# Patient Record
Sex: FEMALE | Race: WHITE | Employment: OTHER | URBAN - NONMETROPOLITAN AREA
[De-identification: names, ages, dates, MRNs, and addresses within clinical notes are randomized per-mention and may not be internally consistent; named-entity substitution may affect disease eponyms.]

---

## 2018-02-01 ENCOUNTER — DOCUMENTATION ONLY (OUTPATIENT)
Dept: FAMILY MEDICINE | Facility: OTHER | Age: 68
End: 2018-02-01

## 2018-02-01 RX ORDER — IBUPROFEN 800 MG/1
800 TABLET, FILM COATED ORAL EVERY 8 HOURS PRN
Status: ON HOLD | COMMUNITY
End: 2021-08-22

## 2018-02-01 RX ORDER — METOPROLOL SUCCINATE 100 MG/1
100 TABLET, EXTENDED RELEASE ORAL DAILY
COMMUNITY

## 2018-02-01 RX ORDER — DULOXETIN HYDROCHLORIDE 20 MG/1
60 CAPSULE, DELAYED RELEASE ORAL DAILY
Status: ON HOLD | COMMUNITY
End: 2021-08-19

## 2018-02-01 RX ORDER — NICOTINE 21 MG/24HR
1 PATCH, TRANSDERMAL 24 HOURS TRANSDERMAL
Status: ON HOLD | COMMUNITY
End: 2021-08-19

## 2018-02-01 RX ORDER — HYDROXYZINE HYDROCHLORIDE 25 MG/1
25 TABLET, FILM COATED ORAL
COMMUNITY

## 2018-06-06 DIAGNOSIS — R06.09 DOE (DYSPNEA ON EXERTION): Primary | ICD-10-CM

## 2018-06-13 ENCOUNTER — TELEPHONE (OUTPATIENT)
Dept: CARDIOLOGY | Facility: OTHER | Age: 68
End: 2018-06-13

## 2018-06-13 NOTE — TELEPHONE ENCOUNTER
Patient verified .  Reminder call for stress test with instructions given.and to hold beta blocker  Patient verbalized understanding.

## 2018-06-19 ENCOUNTER — HOSPITAL ENCOUNTER (OUTPATIENT)
Dept: CARDIOLOGY | Facility: OTHER | Age: 68
End: 2018-06-19
Payer: MEDICARE

## 2018-06-19 ENCOUNTER — HOSPITAL ENCOUNTER (OUTPATIENT)
Dept: CARDIOLOGY | Facility: OTHER | Age: 68
Discharge: HOME | End: 2018-06-19
Admitting: INTERNAL MEDICINE
Payer: MEDICARE

## 2018-06-19 VITALS — HEIGHT: 64 IN | WEIGHT: 223 LBS | BODY MASS INDEX: 38.07 KG/M2

## 2018-06-19 DIAGNOSIS — R06.09 DOE (DYSPNEA ON EXERTION): ICD-10-CM

## 2018-06-19 PROCEDURE — 93325 DOPPLER ECHO COLOR FLOW MAPG: CPT | Mod: 26 | Performed by: INTERNAL MEDICINE

## 2018-06-19 PROCEDURE — 93350 STRESS TTE ONLY: CPT | Mod: 26 | Performed by: INTERNAL MEDICINE

## 2018-06-19 PROCEDURE — 40000264 ECHO DOBUTAMINE STRESS TEST WITH DEFINITY

## 2018-06-19 PROCEDURE — 93016 CV STRESS TEST SUPVJ ONLY: CPT | Performed by: INTERNAL MEDICINE

## 2018-06-19 PROCEDURE — 93321 DOPPLER ECHO F-UP/LMTD STD: CPT | Mod: 26 | Performed by: INTERNAL MEDICINE

## 2018-06-19 PROCEDURE — 25000128 H RX IP 250 OP 636: Performed by: INTERNAL MEDICINE

## 2018-06-19 PROCEDURE — 25500064 ZZH RX 255 OP 636

## 2018-06-19 PROCEDURE — 93017 CV STRESS TEST TRACING ONLY: CPT

## 2018-06-19 PROCEDURE — 93018 CV STRESS TEST I&R ONLY: CPT | Performed by: INTERNAL MEDICINE

## 2018-06-19 RX ORDER — SODIUM CHLORIDE 9 MG/ML
INJECTION, SOLUTION INTRAVENOUS CONTINUOUS
Status: DISCONTINUED | OUTPATIENT
Start: 2018-06-19 | End: 2018-06-20 | Stop reason: HOSPADM

## 2018-06-19 RX ORDER — ATROPINE SULFATE 0.1 MG/ML
0.2 INJECTION INTRAVENOUS
Status: DISCONTINUED | OUTPATIENT
Start: 2018-06-19 | End: 2018-06-20 | Stop reason: HOSPADM

## 2018-06-19 RX ORDER — DOBUTAMINE HYDROCHLORIDE 200 MG/100ML
10-40 INJECTION INTRAVENOUS CONTINUOUS
Status: DISCONTINUED | OUTPATIENT
Start: 2018-06-19 | End: 2018-06-20 | Stop reason: HOSPADM

## 2018-06-19 RX ORDER — ALBUTEROL SULFATE 90 UG/1
2 AEROSOL, METERED RESPIRATORY (INHALATION) EVERY 6 HOURS PRN
COMMUNITY
Start: 2018-04-06

## 2018-06-19 RX ORDER — TRAZODONE HYDROCHLORIDE 100 MG/1
150 TABLET ORAL AT BEDTIME
COMMUNITY

## 2018-06-19 RX ORDER — IPRATROPIUM BROMIDE AND ALBUTEROL SULFATE 2.5; .5 MG/3ML; MG/3ML
3 SOLUTION RESPIRATORY (INHALATION)
Status: ON HOLD | COMMUNITY
Start: 2018-04-06 | End: 2021-08-19

## 2018-06-19 RX ADMIN — PERFLUTREN 9 ML: 6.52 INJECTION, SUSPENSION INTRAVENOUS at 08:45

## 2018-06-19 RX ADMIN — DOBUTAMINE IN DEXTROSE 10 MCG/KG/MIN: 200 INJECTION, SOLUTION INTRAVENOUS at 08:30

## 2018-06-19 NOTE — PROGRESS NOTES
0730 The patient arrived for a Dobutamine stress echo.  The procedure, risks and benefits were discussed and the consent was signed.  The patient was prepped for the stress test, an IV was started,  and the echo sonographer did the initial images with Definity for image enhancement.   Dr. Randhawa arrived, and the Dobutamine protocol was started via multistep infusion.  The patient tolerated the procedure well.  Stress images were completed, normal saline was infused post Dobutamine,  the IV was removed. The patient was informed that the test results would be given to them by Esther Godinez NP.  The patient was released in stable condition.  Please see the chart for complete test results.

## 2018-06-26 ENCOUNTER — HOSPITAL ENCOUNTER (OUTPATIENT)
Dept: RESPIRATORY THERAPY | Facility: OTHER | Age: 68
Discharge: HOME | End: 2018-06-26
Attending: PHYSICIAN ASSISTANT | Admitting: PHYSICIAN ASSISTANT
Payer: MEDICARE

## 2018-06-26 LAB
DLCOCOR-%PRED-PRE: 117 %
DLCOCOR-PRE: 24.21 ML/MIN/MMHG
DLCOUNC-%PRED-PRE: 122 %
DLCOUNC-PRE: 25.26 ML/MIN/MMHG
DLCOUNC-PRED: 20.65 ML/MIN/MMHG
ERV-%PRED-PRE: 42 %
ERV-PRE: 0.09 L
ERV-PRED: 0.22 L
EXPTIME-PRE: 7.29 SEC
FEF2575-%PRED-POST: 61 %
FEF2575-%PRED-PRE: 37 %
FEF2575-POST: 1.17 L/SEC
FEF2575-PRE: 0.72 L/SEC
FEF2575-PRED: 1.92 L/SEC
FEFMAX-%PRED-PRE: 61 %
FEFMAX-PRE: 3.5 L/SEC
FEFMAX-PRED: 5.73 L/SEC
FEV1-%PRED-PRE: 56 %
FEV1-PRE: 1.27 L
FEV1FEV6-PRE: 65 %
FEV1FEV6-PRED: 79 %
FEV1FVC-PRE: 65 %
FEV1FVC-PRED: 76 %
FEV1SVC-PRE: 47 %
FEV1SVC-PRED: 72 %
FIFMAX-PRE: 2.12 L/SEC
FRCPLETH-%PRED-PRE: 159 %
FRCPLETH-PRE: 4.32 L
FRCPLETH-PRED: 2.71 L
FVC-%PRED-PRE: 67 %
FVC-PRE: 1.95 L
FVC-PRED: 2.89 L
GAW-%PRED-PRE: 13 %
GAW-PRE: 0.13 L/S/CMH2O
GAW-PRED: 1.03 L/S/CMH2O
IC-%PRED-PRE: 91 %
IC-PRE: 2.63 L
IC-PRED: 2.87 L
RVPLETH-%PRED-PRE: 206 %
RVPLETH-PRE: 4.22 L
RVPLETH-PRED: 2.04 L
SGAW-%PRED-PRE: 30 %
SGAW-PRE: 0.03 1/CMH2O*S
SGAW-PRED: 0.1 1/CMH2O*S
SRAW-%PRED-PRE: 701 %
SRAW-PRE: 33.41 CMH2O*S
SRAW-PRED: 4.76 CMH2O*S
TLCPLETH-%PRED-PRE: 140 %
TLCPLETH-PRE: 6.94 L
TLCPLETH-PRED: 4.94 L
VA-%PRED-PRE: 99 %
VA-PRE: 5.02 L
VC-%PRED-PRE: 88 %
VC-PRE: 2.72 L
VC-PRED: 3.09 L

## 2018-06-26 PROCEDURE — 94010 BREATHING CAPACITY TEST: CPT | Mod: 26 | Performed by: INTERNAL MEDICINE

## 2018-06-26 PROCEDURE — 40000275 ZZH STATISTIC RCP TIME EA 10 MIN

## 2018-06-26 PROCEDURE — 94726 PLETHYSMOGRAPHY LUNG VOLUMES: CPT

## 2018-06-26 PROCEDURE — 94060 EVALUATION OF WHEEZING: CPT

## 2018-06-26 PROCEDURE — 94729 DIFFUSING CAPACITY: CPT

## 2018-06-26 PROCEDURE — 94640 AIRWAY INHALATION TREATMENT: CPT

## 2018-06-26 PROCEDURE — 40000809 ZZH STATISTIC NO DOCUMENTATION TO SUPPORT CHARGE

## 2018-06-26 PROCEDURE — 94726 PLETHYSMOGRAPHY LUNG VOLUMES: CPT | Mod: 26 | Performed by: INTERNAL MEDICINE

## 2018-06-26 PROCEDURE — 94729 DIFFUSING CAPACITY: CPT | Mod: 26 | Performed by: INTERNAL MEDICINE

## 2018-06-26 PROCEDURE — 25000125 ZZHC RX 250: Performed by: PHYSICIAN ASSISTANT

## 2018-06-26 PROCEDURE — 94010 BREATHING CAPACITY TEST: CPT

## 2018-06-26 RX ORDER — ALBUTEROL SULFATE 0.83 MG/ML
2.5 SOLUTION RESPIRATORY (INHALATION) ONCE
Status: COMPLETED | OUTPATIENT
Start: 2018-06-26 | End: 2018-06-26

## 2018-06-26 RX ADMIN — ALBUTEROL SULFATE 2.5 MG: 2.5 SOLUTION RESPIRATORY (INHALATION) at 14:26

## 2021-08-19 ENCOUNTER — HOSPITAL ENCOUNTER (INPATIENT)
Facility: OTHER | Age: 71
LOS: 3 days | Discharge: HOME | DRG: 177 | End: 2021-08-22
Attending: EMERGENCY MEDICINE | Admitting: INTERNAL MEDICINE
Payer: COMMERCIAL

## 2021-08-19 ENCOUNTER — APPOINTMENT (OUTPATIENT)
Dept: GENERAL RADIOLOGY | Facility: OTHER | Age: 71
DRG: 177 | End: 2021-08-19
Attending: EMERGENCY MEDICINE
Payer: COMMERCIAL

## 2021-08-19 DIAGNOSIS — J12.82 PNEUMONIA DUE TO COVID-19 VIRUS: Primary | ICD-10-CM

## 2021-08-19 DIAGNOSIS — F41.9 ANXIETY: ICD-10-CM

## 2021-08-19 DIAGNOSIS — F17.210 CIGARETTE SMOKER: ICD-10-CM

## 2021-08-19 DIAGNOSIS — Z99.81 DEPENDENCE ON SUPPLEMENTAL OXYGEN: ICD-10-CM

## 2021-08-19 DIAGNOSIS — U07.1 2019 NOVEL CORONAVIRUS DISEASE (COVID-19): ICD-10-CM

## 2021-08-19 DIAGNOSIS — J44.89 COPD WITH ASTHMA (H): ICD-10-CM

## 2021-08-19 DIAGNOSIS — U07.1 PNEUMONIA DUE TO COVID-19 VIRUS: Primary | ICD-10-CM

## 2021-08-19 DIAGNOSIS — G47.00 INSOMNIA, UNSPECIFIED TYPE: ICD-10-CM

## 2021-08-19 DIAGNOSIS — Z79.51 LONG TERM CURRENT USE OF INHALED STEROID: ICD-10-CM

## 2021-08-19 PROBLEM — J96.01 ACUTE RESPIRATORY FAILURE WITH HYPOXIA (H): Status: ACTIVE | Noted: 2021-08-19

## 2021-08-19 PROBLEM — J45.909 ASTHMA: Status: ACTIVE | Noted: 2021-08-19

## 2021-08-19 LAB
ABO/RH(D): NORMAL
ALBUMIN SERPL-MCNC: 3.6 G/DL (ref 3.5–5.7)
ALP SERPL-CCNC: 28 U/L (ref 34–104)
ALT SERPL W P-5'-P-CCNC: 20 U/L (ref 7–52)
ANION GAP SERPL CALCULATED.3IONS-SCNC: 8 MMOL/L (ref 3–14)
AST SERPL W P-5'-P-CCNC: 34 U/L (ref 13–39)
BASE EXCESS BLDV CALC-SCNC: 8 MMOL/L (ref -7.7–1.9)
BASOPHILS # BLD AUTO: 0 10E3/UL (ref 0–0.2)
BASOPHILS NFR BLD AUTO: 0 %
BILIRUB SERPL-MCNC: 0.6 MG/DL (ref 0.3–1)
BUN SERPL-MCNC: 13 MG/DL (ref 7–25)
CALCIUM SERPL-MCNC: 8.8 MG/DL (ref 8.6–10.3)
CHLORIDE BLD-SCNC: 95 MMOL/L (ref 98–107)
CO2 SERPL-SCNC: 32 MMOL/L (ref 21–31)
CREAT SERPL-MCNC: 0.88 MG/DL (ref 0.6–1.2)
CRP SERPL-MCNC: 107.8 MG/L
D DIMER PPP FEU-MCNC: 1.13 UG/ML FEU (ref 0–0.5)
EOSINOPHIL # BLD AUTO: 0 10E3/UL (ref 0–0.7)
EOSINOPHIL NFR BLD AUTO: 0 %
ERYTHROCYTE [DISTWIDTH] IN BLOOD BY AUTOMATED COUNT: 13.8 % (ref 10–15)
GFR SERPL CREATININE-BSD FRML MDRD: 66 ML/MIN/1.73M2
GLUCOSE BLD-MCNC: 157 MG/DL (ref 70–105)
HCO3 BLDV-SCNC: 34 MMOL/L (ref 21–28)
HCT VFR BLD AUTO: 43.5 % (ref 35–47)
HGB BLD-MCNC: 14.4 G/DL (ref 11.7–15.7)
HOLD SPECIMEN: NORMAL
HOLD SPECIMEN: NORMAL
IMM GRANULOCYTES # BLD: 0 10E3/UL
IMM GRANULOCYTES NFR BLD: 0 %
LACTATE SERPL-SCNC: 1.9 MMOL/L (ref 0.7–2)
LYMPHOCYTES # BLD AUTO: 0.5 10E3/UL (ref 0.8–5.3)
LYMPHOCYTES NFR BLD AUTO: 7 %
MAGNESIUM SERPL-MCNC: 2 MG/DL (ref 1.9–2.7)
MCH RBC QN AUTO: 29.8 PG (ref 26.5–33)
MCHC RBC AUTO-ENTMCNC: 33.1 G/DL (ref 31.5–36.5)
MCV RBC AUTO: 90 FL (ref 78–100)
MONOCYTES # BLD AUTO: 0.3 10E3/UL (ref 0–1.3)
MONOCYTES NFR BLD AUTO: 4 %
NEUTROPHILS # BLD AUTO: 6.4 10E3/UL (ref 1.6–8.3)
NEUTROPHILS NFR BLD AUTO: 89 %
NRBC # BLD AUTO: 0 10E3/UL
NRBC BLD AUTO-RTO: 0 /100
O2/TOTAL GAS SETTING VFR VENT: 64 %
OXYHGB MFR BLDV: 70 % (ref 70–75)
PCO2 BLDV: 48 MM HG (ref 40–50)
PH BLDV: 7.45 [PH] (ref 7.32–7.43)
PLATELET # BLD AUTO: 156 10E3/UL (ref 150–450)
PO2 BLDV: 35 MM HG (ref 25–47)
POTASSIUM BLD-SCNC: 4.2 MMOL/L (ref 3.5–5.1)
PROT SERPL-MCNC: 7.1 G/DL (ref 6.4–8.9)
RBC # BLD AUTO: 4.83 10E6/UL (ref 3.8–5.2)
SODIUM SERPL-SCNC: 135 MMOL/L (ref 134–144)
SPECIMEN EXPIRATION DATE: NORMAL
TROPONIN I SERPL-MCNC: 13.7 PG/ML (ref 0–34)
WBC # BLD AUTO: 7.2 10E3/UL (ref 4–11)

## 2021-08-19 PROCEDURE — 200N000001 HC R&B ICU

## 2021-08-19 PROCEDURE — 99223 1ST HOSP IP/OBS HIGH 75: CPT | Mod: AI | Performed by: INTERNAL MEDICINE

## 2021-08-19 PROCEDURE — 258N000003 HC RX IP 258 OP 636: Performed by: EMERGENCY MEDICINE

## 2021-08-19 PROCEDURE — 36415 COLL VENOUS BLD VENIPUNCTURE: CPT | Performed by: EMERGENCY MEDICINE

## 2021-08-19 PROCEDURE — 94640 AIRWAY INHALATION TREATMENT: CPT

## 2021-08-19 PROCEDURE — 83735 ASSAY OF MAGNESIUM: CPT | Performed by: EMERGENCY MEDICINE

## 2021-08-19 PROCEDURE — 85379 FIBRIN DEGRADATION QUANT: CPT | Performed by: INTERNAL MEDICINE

## 2021-08-19 PROCEDURE — 250N000013 HC RX MED GY IP 250 OP 250 PS 637: Mod: GY | Performed by: EMERGENCY MEDICINE

## 2021-08-19 PROCEDURE — 87040 BLOOD CULTURE FOR BACTERIA: CPT | Performed by: EMERGENCY MEDICINE

## 2021-08-19 PROCEDURE — 258N000003 HC RX IP 258 OP 636: Performed by: INTERNAL MEDICINE

## 2021-08-19 PROCEDURE — 999N000105 HC STATISTIC NO DOCUMENTATION TO SUPPORT CHARGE

## 2021-08-19 PROCEDURE — 250N000012 HC RX MED GY IP 250 OP 636 PS 637: Performed by: INTERNAL MEDICINE

## 2021-08-19 PROCEDURE — 99285 EMERGENCY DEPT VISIT HI MDM: CPT | Mod: 25 | Performed by: EMERGENCY MEDICINE

## 2021-08-19 PROCEDURE — 82805 BLOOD GASES W/O2 SATURATION: CPT | Performed by: EMERGENCY MEDICINE

## 2021-08-19 PROCEDURE — 86140 C-REACTIVE PROTEIN: CPT | Performed by: INTERNAL MEDICINE

## 2021-08-19 PROCEDURE — 94664 DEMO&/EVAL PT USE INHALER: CPT

## 2021-08-19 PROCEDURE — 250N000013 HC RX MED GY IP 250 OP 250 PS 637: Mod: GY | Performed by: INTERNAL MEDICINE

## 2021-08-19 PROCEDURE — 94640 AIRWAY INHALATION TREATMENT: CPT | Mod: 76

## 2021-08-19 PROCEDURE — 83605 ASSAY OF LACTIC ACID: CPT | Performed by: EMERGENCY MEDICINE

## 2021-08-19 PROCEDURE — 71045 X-RAY EXAM CHEST 1 VIEW: CPT

## 2021-08-19 PROCEDURE — 99285 EMERGENCY DEPT VISIT HI MDM: CPT | Performed by: EMERGENCY MEDICINE

## 2021-08-19 PROCEDURE — 85025 COMPLETE CBC W/AUTO DIFF WBC: CPT | Performed by: EMERGENCY MEDICINE

## 2021-08-19 PROCEDURE — 96374 THER/PROPH/DIAG INJ IV PUSH: CPT | Performed by: EMERGENCY MEDICINE

## 2021-08-19 PROCEDURE — 250N000009 HC RX 250: Performed by: INTERNAL MEDICINE

## 2021-08-19 PROCEDURE — 86901 BLOOD TYPING SEROLOGIC RH(D): CPT | Performed by: INTERNAL MEDICINE

## 2021-08-19 PROCEDURE — 80053 COMPREHEN METABOLIC PANEL: CPT | Performed by: EMERGENCY MEDICINE

## 2021-08-19 PROCEDURE — 84484 ASSAY OF TROPONIN QUANT: CPT | Performed by: EMERGENCY MEDICINE

## 2021-08-19 PROCEDURE — 250N000011 HC RX IP 250 OP 636: Performed by: INTERNAL MEDICINE

## 2021-08-19 PROCEDURE — 250N000011 HC RX IP 250 OP 636: Performed by: EMERGENCY MEDICINE

## 2021-08-19 PROCEDURE — 999N000157 HC STATISTIC RCP TIME EA 10 MIN

## 2021-08-19 PROCEDURE — XW033E5 INTRODUCTION OF REMDESIVIR ANTI-INFECTIVE INTO PERIPHERAL VEIN, PERCUTANEOUS APPROACH, NEW TECHNOLOGY GROUP 5: ICD-10-PCS | Performed by: INTERNAL MEDICINE

## 2021-08-19 RX ORDER — BUPROPION HYDROCHLORIDE 150 MG/1
150 TABLET ORAL EVERY MORNING
COMMUNITY

## 2021-08-19 RX ORDER — LIDOCAINE 50 MG/G
1 PATCH TOPICAL DAILY PRN
COMMUNITY

## 2021-08-19 RX ORDER — PREDNISONE 10 MG/1
20 TABLET ORAL 2 TIMES DAILY
Status: ON HOLD | COMMUNITY
End: 2021-08-22

## 2021-08-19 RX ORDER — ACETAMINOPHEN 650 MG/1
650 SUPPOSITORY RECTAL ONCE
Status: COMPLETED | OUTPATIENT
Start: 2021-08-19 | End: 2021-08-19

## 2021-08-19 RX ORDER — NALOXONE HYDROCHLORIDE 0.4 MG/ML
0.4 INJECTION, SOLUTION INTRAMUSCULAR; INTRAVENOUS; SUBCUTANEOUS
Status: DISCONTINUED | OUTPATIENT
Start: 2021-08-19 | End: 2021-08-22 | Stop reason: HOSPADM

## 2021-08-19 RX ORDER — PROCHLORPERAZINE 25 MG
12.5 SUPPOSITORY, RECTAL RECTAL EVERY 12 HOURS PRN
Status: DISCONTINUED | OUTPATIENT
Start: 2021-08-19 | End: 2021-08-22 | Stop reason: HOSPADM

## 2021-08-19 RX ORDER — ALBUTEROL SULFATE 90 UG/1
2 AEROSOL, METERED RESPIRATORY (INHALATION) 4 TIMES DAILY
Status: DISCONTINUED | OUTPATIENT
Start: 2021-08-19 | End: 2021-08-22 | Stop reason: HOSPADM

## 2021-08-19 RX ORDER — ALBUTEROL SULFATE 0.83 MG/ML
2.5 SOLUTION RESPIRATORY (INHALATION) EVERY 4 HOURS PRN
COMMUNITY

## 2021-08-19 RX ORDER — AMOXICILLIN 250 MG
1 CAPSULE ORAL 2 TIMES DAILY PRN
Status: DISCONTINUED | OUTPATIENT
Start: 2021-08-19 | End: 2021-08-22 | Stop reason: HOSPADM

## 2021-08-19 RX ORDER — ONDANSETRON 2 MG/ML
4 INJECTION INTRAMUSCULAR; INTRAVENOUS EVERY 6 HOURS PRN
Status: DISCONTINUED | OUTPATIENT
Start: 2021-08-19 | End: 2021-08-22 | Stop reason: HOSPADM

## 2021-08-19 RX ORDER — PROCHLORPERAZINE MALEATE 5 MG
5 TABLET ORAL EVERY 6 HOURS PRN
Status: DISCONTINUED | OUTPATIENT
Start: 2021-08-19 | End: 2021-08-22 | Stop reason: HOSPADM

## 2021-08-19 RX ORDER — BUPROPION HYDROCHLORIDE 150 MG/1
150 TABLET ORAL DAILY
Status: DISCONTINUED | OUTPATIENT
Start: 2021-08-19 | End: 2021-08-22 | Stop reason: HOSPADM

## 2021-08-19 RX ORDER — TIOTROPIUM BROMIDE 18 UG/1
18 CAPSULE ORAL; RESPIRATORY (INHALATION) DAILY
COMMUNITY

## 2021-08-19 RX ORDER — TRAZODONE HYDROCHLORIDE 50 MG/1
150 TABLET, FILM COATED ORAL AT BEDTIME
Status: DISCONTINUED | OUTPATIENT
Start: 2021-08-19 | End: 2021-08-22 | Stop reason: HOSPADM

## 2021-08-19 RX ORDER — METHYLPREDNISOLONE SODIUM SUCCINATE 125 MG/2ML
125 INJECTION, POWDER, LYOPHILIZED, FOR SOLUTION INTRAMUSCULAR; INTRAVENOUS ONCE
Status: COMPLETED | OUTPATIENT
Start: 2021-08-19 | End: 2021-08-19

## 2021-08-19 RX ORDER — AMOXICILLIN 250 MG
2 CAPSULE ORAL 2 TIMES DAILY PRN
Status: DISCONTINUED | OUTPATIENT
Start: 2021-08-19 | End: 2021-08-22 | Stop reason: HOSPADM

## 2021-08-19 RX ORDER — LIDOCAINE 40 MG/G
CREAM TOPICAL
Status: DISCONTINUED | OUTPATIENT
Start: 2021-08-19 | End: 2021-08-22 | Stop reason: HOSPADM

## 2021-08-19 RX ORDER — METOPROLOL SUCCINATE 100 MG/1
100 TABLET, EXTENDED RELEASE ORAL DAILY
Status: DISCONTINUED | OUTPATIENT
Start: 2021-08-19 | End: 2021-08-22 | Stop reason: HOSPADM

## 2021-08-19 RX ORDER — LIDOCAINE 50 MG/G
1 PATCH TOPICAL DAILY PRN
Status: DISCONTINUED | OUTPATIENT
Start: 2021-08-19 | End: 2021-08-22 | Stop reason: HOSPADM

## 2021-08-19 RX ORDER — METOPROLOL SUCCINATE 50 MG/1
50 TABLET, EXTENDED RELEASE ORAL DAILY
Status: DISCONTINUED | OUTPATIENT
Start: 2021-08-19 | End: 2021-08-19 | Stop reason: DRUGHIGH

## 2021-08-19 RX ORDER — ACETAMINOPHEN 325 MG/1
650 TABLET ORAL ONCE
Status: COMPLETED | OUTPATIENT
Start: 2021-08-19 | End: 2021-08-19

## 2021-08-19 RX ORDER — NALOXONE HYDROCHLORIDE 0.4 MG/ML
0.2 INJECTION, SOLUTION INTRAMUSCULAR; INTRAVENOUS; SUBCUTANEOUS
Status: DISCONTINUED | OUTPATIENT
Start: 2021-08-19 | End: 2021-08-22 | Stop reason: HOSPADM

## 2021-08-19 RX ORDER — OLOPATADINE HYDROCHLORIDE 1 MG/ML
SOLUTION/ DROPS OPHTHALMIC
COMMUNITY

## 2021-08-19 RX ORDER — ALBUTEROL SULFATE 90 UG/1
6 AEROSOL, METERED RESPIRATORY (INHALATION) ONCE
Status: COMPLETED | OUTPATIENT
Start: 2021-08-19 | End: 2021-08-19

## 2021-08-19 RX ORDER — FAMOTIDINE 20 MG/1
20 TABLET, FILM COATED ORAL 2 TIMES DAILY
Status: DISCONTINUED | OUTPATIENT
Start: 2021-08-19 | End: 2021-08-22 | Stop reason: HOSPADM

## 2021-08-19 RX ORDER — SODIUM CHLORIDE, SODIUM LACTATE, POTASSIUM CHLORIDE, CALCIUM CHLORIDE 600; 310; 30; 20 MG/100ML; MG/100ML; MG/100ML; MG/100ML
INJECTION, SOLUTION INTRAVENOUS CONTINUOUS
Status: DISCONTINUED | OUTPATIENT
Start: 2021-08-19 | End: 2021-08-22

## 2021-08-19 RX ORDER — HYDROMORPHONE HYDROCHLORIDE 1 MG/ML
0.2 INJECTION, SOLUTION INTRAMUSCULAR; INTRAVENOUS; SUBCUTANEOUS
Status: DISCONTINUED | OUTPATIENT
Start: 2021-08-19 | End: 2021-08-22 | Stop reason: HOSPADM

## 2021-08-19 RX ORDER — ACETAMINOPHEN 325 MG/1
975 TABLET ORAL EVERY 8 HOURS
Status: DISCONTINUED | OUTPATIENT
Start: 2021-08-19 | End: 2021-08-22 | Stop reason: HOSPADM

## 2021-08-19 RX ORDER — ONDANSETRON 4 MG/1
4 TABLET, ORALLY DISINTEGRATING ORAL EVERY 6 HOURS PRN
Status: DISCONTINUED | OUTPATIENT
Start: 2021-08-19 | End: 2021-08-22 | Stop reason: HOSPADM

## 2021-08-19 RX ADMIN — ACETAMINOPHEN 975 MG: 325 TABLET, FILM COATED ORAL at 15:54

## 2021-08-19 RX ADMIN — ACETAMINOPHEN 650 MG: 325 TABLET, FILM COATED ORAL at 09:55

## 2021-08-19 RX ADMIN — DEXAMETHASONE 6 MG: 2 TABLET ORAL at 12:38

## 2021-08-19 RX ADMIN — BUPROPION HYDROCHLORIDE 150 MG: 150 TABLET, FILM COATED, EXTENDED RELEASE ORAL at 15:56

## 2021-08-19 RX ADMIN — SODIUM CHLORIDE, POTASSIUM CHLORIDE, SODIUM LACTATE AND CALCIUM CHLORIDE: 600; 310; 30; 20 INJECTION, SOLUTION INTRAVENOUS at 12:38

## 2021-08-19 RX ADMIN — ACETAMINOPHEN 975 MG: 325 TABLET, FILM COATED ORAL at 23:53

## 2021-08-19 RX ADMIN — SODIUM CHLORIDE, POTASSIUM CHLORIDE, SODIUM LACTATE AND CALCIUM CHLORIDE: 600; 310; 30; 20 INJECTION, SOLUTION INTRAVENOUS at 09:56

## 2021-08-19 RX ADMIN — IPRATROPIUM BROMIDE 2 PUFF: 17 AEROSOL, METERED RESPIRATORY (INHALATION) at 19:29

## 2021-08-19 RX ADMIN — ALBUTEROL SULFATE 6 PUFF: 90 AEROSOL, METERED RESPIRATORY (INHALATION) at 10:07

## 2021-08-19 RX ADMIN — SODIUM CHLORIDE, POTASSIUM CHLORIDE, SODIUM LACTATE AND CALCIUM CHLORIDE: 600; 310; 30; 20 INJECTION, SOLUTION INTRAVENOUS at 21:05

## 2021-08-19 RX ADMIN — REMDESIVIR 200 MG: 100 INJECTION, POWDER, LYOPHILIZED, FOR SOLUTION INTRAVENOUS at 12:50

## 2021-08-19 RX ADMIN — IPRATROPIUM BROMIDE 2 PUFF: 17 AEROSOL, METERED RESPIRATORY (INHALATION) at 13:49

## 2021-08-19 RX ADMIN — TRAZODONE HYDROCHLORIDE 150 MG: 50 TABLET ORAL at 21:07

## 2021-08-19 RX ADMIN — ALBUTEROL SULFATE 2 PUFF: 90 AEROSOL, METERED RESPIRATORY (INHALATION) at 19:29

## 2021-08-19 RX ADMIN — METHYLPREDNISOLONE SODIUM SUCCINATE 125 MG: 125 INJECTION, POWDER, FOR SOLUTION INTRAMUSCULAR; INTRAVENOUS at 10:59

## 2021-08-19 RX ADMIN — ALBUTEROL SULFATE 2 PUFF: 90 AEROSOL, METERED RESPIRATORY (INHALATION) at 13:49

## 2021-08-19 RX ADMIN — FAMOTIDINE 20 MG: 20 TABLET ORAL at 21:06

## 2021-08-19 RX ADMIN — IPRATROPIUM BROMIDE 2 PUFF: 17 AEROSOL, METERED RESPIRATORY (INHALATION) at 15:40

## 2021-08-19 RX ADMIN — SODIUM CHLORIDE 50 ML: 9 INJECTION, SOLUTION INTRAVENOUS at 14:50

## 2021-08-19 RX ADMIN — ALBUTEROL SULFATE 2 PUFF: 90 AEROSOL, METERED RESPIRATORY (INHALATION) at 15:41

## 2021-08-19 RX ADMIN — TIOTROPIUM BROMIDE INHALATION SPRAY 2 PUFF: 3.12 SPRAY, METERED RESPIRATORY (INHALATION) at 15:41

## 2021-08-19 RX ADMIN — FAMOTIDINE 20 MG: 20 TABLET ORAL at 12:59

## 2021-08-19 RX ADMIN — METOPROLOL SUCCINATE 100 MG: 100 TABLET, EXTENDED RELEASE ORAL at 14:50

## 2021-08-19 RX ADMIN — ENOXAPARIN SODIUM 50 MG: 60 INJECTION SUBCUTANEOUS at 21:10

## 2021-08-19 RX ADMIN — FLUTICASONE FUROATE AND VILANTEROL TRIFENATATE 1 PUFF: 200; 25 POWDER RESPIRATORY (INHALATION) at 13:49

## 2021-08-19 ASSESSMENT — ACTIVITIES OF DAILY LIVING (ADL)
WALKING_OR_CLIMBING_STAIRS_DIFFICULTY: NO
NUMBER_OF_TIMES_PATIENT_HAS_FALLEN_WITHIN_LAST_SIX_MONTHS: 1
ADLS_ACUITY_SCORE: 17
HEARING_DIFFICULTY_OR_DEAF: NO
DIFFICULTY_COMMUNICATING: NO
CONCENTRATING,_REMEMBERING_OR_MAKING_DECISIONS_DIFFICULTY: NO
ADLS_ACUITY_SCORE: 15
FALL_HISTORY_WITHIN_LAST_SIX_MONTHS: YES
ADLS_ACUITY_SCORE: 16
DOING_ERRANDS_INDEPENDENTLY_DIFFICULTY: NO
WEAR_GLASSES_OR_BLIND: YES
DRESSING/BATHING_DIFFICULTY: NO
DIFFICULTY_EATING/SWALLOWING: NO
TOILETING_ISSUES: NO

## 2021-08-19 ASSESSMENT — ENCOUNTER SYMPTOMS
NAUSEA: 0
AGITATION: 0
CHILLS: 1
COUGH: 1
FEVER: 1
VOMITING: 0
SHORTNESS OF BREATH: 1
LIGHT-HEADEDNESS: 0
DIAPHORESIS: 1
ARTHRALGIAS: 0
DYSURIA: 0

## 2021-08-19 ASSESSMENT — MIFFLIN-ST. JEOR
SCORE: 1496
SCORE: 1496.55

## 2021-08-19 NOTE — H&P
[facility]    History and Physical - Hospitalist Service       Date of Admission:  8/19/2021    Assessment & Plan      Prisca Casillas is a 70 year old female admitted on 8/19/2021. She has acute respiratory failure secondary to covid.     Principal Problem:    Acute respiratory failure with hypoxia (H)  Assessment: secondary to covid, requiring increase in oxygen need from her baseline 3 liters, to 6 liters. Not vaccinated.   Plan: Admit, dexamethasone, remdesevir, lovenox, supplemental oxygen.   Active Problems:    Pneumonia due to COVID-19 virus  Assessment: present on admission   Plan: as above      Essential hypertension  Assessment: chronic  Plan: continue toprol      COPD with asthma (H)  Assessment: chronic  Plan: continue inhaled meds.     Diet: Combination Diet Regular Diet Adult    DVT Prophylaxis: Enoxaparin (Lovenox) SQ  Tirado Catheter: Not present  Central Lines: None  Code Status:   full    Disposition Plan   Expected discharge: 3-4 days, recommended to prior living arrangement once COVID treated.     The patient's care was discussed with the Patient.    Ezra Camilo MD  [facility]  Securely message with the Vocera Web Console (learn more here)  Text page via [facility] Paging/Directory      ______________________________________________________________________    Chief Complaint   Dyspnea     History is obtained from the patient    History of Present Illness   Prisca Casillas is a 70 year old female who presents with shortness of breath and hypoxia.  She was diagnosed with COVID-19 on August 17, but had symptoms for about 5 days prior to that.  She is not vaccinated for Covid.  She was treated with study drug casirivimab/imdevimab in [place] on the 17th and started prednisone.  Her symptoms have continued to worsen and she felt unsafe at home today.  EMS was called and she was found to have O2 sats of 83% on her chronic 3 L of oxygen at home.  They put  her on a nonrebreather mask and she was in the mid 90s.  She has a history of COPD, and quit smoking 1 year ago.  She does use a vaping device intermittently.  She was admitted for acute respiratory failure due to Covid.    Review of Systems    CONSTITUTIONAL:POSITIVE  for chills and fever  INTEGUMENTARY/SKIN: NEGATIVE for worrisome rashes, moles or lesions  EYES: NEGATIVE for vision changes or irritation  ENT/MOUTH: NEGATIVE for ear, mouth and throat problems  RESP:POSITIVE for cough-non productive, dyspnea on exertion and Hx COPD  CV: NEGATIVE for chest pain, palpitations or peripheral edema  GI: NEGATIVE for nausea, abdominal pain, heartburn, or change in bowel habits  : NEGATIVE for frequency, dysuria, or hematuria  MUSCULOSKELETAL: NEGATIVE for significant arthralgias or myalgia  NEURO: POSITIVE for weakness   ENDOCRINE: NEGATIVE for temperature intolerance, skin/hair changes  HEME: NEGATIVE for bleeding problems  PSYCHIATRIC: NEGATIVE for changes in mood or affect    Past Medical History    I have reviewed this patient's medical history and updated it with pertinent information if needed.   Past Medical History:   Diagnosis Date     Anxiety state     No Comments Provided     Constipation     No Comments Provided     Diaphragmatic hernia     No Comments Provided     Diverticulosis of colon     No Comments Provided     History of colonic polyps     No Comments Provided     Insomnia     No Comments Provided     Meniere's disease     No Comments Provided     Pain in thoracic spine     No Comments Provided     Personal history of malignant neoplasm of cervix uteri     Age 31, vaginal hysterectomy       Past Surgical History   I have reviewed this patient's surgical history and updated it with pertinent information if needed.  Past Surgical History:   Procedure Laterality Date     ATTEMPTED ARTHROSCOPY      knee     COLONOSCOPY      2011     ESOPHAGOSCOPY, GASTROSCOPY, DUODENOSCOPY (EGD), COMBINED      1/23/2014      EXCISE CYST GENERIC (LOCATION)      arm     EXTRACAPSULAR CATARACT EXTRATION WITH INTRAOCULAR LENS IMPLANT      2013     HYSTERECTOMY VAGINAL      at age 31     OTHER SURGICAL HISTORY      [id number],OTHER,in her 40's       Social History   I have reviewed this patient's social history and updated it with pertinent information if needed.  Social History     Tobacco Use     Smoking status: Current Every Day Smoker     Packs/day: 0.50     Years: 52.00     Pack years: 26.00     Types: Cigarettes     Start date: 1/1/1961     Smokeless tobacco: Never Used   Substance Use Topics     Alcohol use: No     Drug use: Unknown     Types: Other     Comment: Drug use: No       Family History   I have reviewed this patient's family history and updated it with pertinent information if needed.  Family History   Problem Relation Age of Onset     Cancer Mother         Cancer     Breast Cancer Mother         Cancer-breast     Breast Cancer Maternal Aunt         Cancer-breast       Prior to Admission Medications   Prior to Admission Medications   Prescriptions Last Dose Informant Patient Reported? Taking?   albuterol (PROAIR HFA/PROVENTIL HFA/VENTOLIN HFA) 108 (90 Base) MCG/ACT Inhaler 8/18/2021 at PM Self Yes Yes   Sig: Inhale 2 puffs into the lungs every 6 hours as needed    albuterol (PROVENTIL) (2.5 MG/3ML) 0.083% neb solution 8/18/2021 at PM Self Yes Yes   Sig: Take 2.5 mg by nebulization every 4 hours as needed for shortness of breath / dyspnea or wheezing    buPROPion (WELLBUTRIN XL) 150 MG 24 hr tablet Past Week at AM Self Yes Yes   Sig: Take 150 mg by mouth every morning   cholecalciferol (VITAMIN D3) 25 mcg (1000 units) capsule Past Week at AM Self Yes Yes   Sig: Take 2 capsules by mouth daily   cyanocobalamin (SM VITAMIN B-12) 500 MCG TABS Past Week at AM Self Yes Yes   Sig: Take 2 tablets by mouth daily    fluticasone-salmeterol (ADVAIR) 500-50 MCG/DOSE inhaler 8/18/2021 at PM Self Yes Yes   Sig: Inhale 1 puff into the lungs  every 12 hours   hydrOXYzine (ATARAX) 25 MG tablet Past Week at PM Self Yes Yes   Sig: Take 25 mg by mouth nightly as needed    ibuprofen (ADVIL/MOTRIN) 800 MG tablet Past Month at AM Self Yes Yes   Sig: Take 800 mg by mouth every 8 hours as needed    lidocaine (LIDODERM) 5 % patch Past Week at AM Self Yes Yes   Sig: Place 1 patch onto the skin daily as needed To prevent lidocaine toxicity, patient should be patch free for 12 hrs daily.    metoprolol succinate (TOPROL-XL) 100 MG 24 hr tablet Past Week at AM Self Yes Yes   Sig: Take 100 mg by mouth daily   olopatadine (PATANOL) 0.1 % ophthalmic solution Past Week at AM Self Yes Yes   Sig: Instill one drop in affected eye(s) twice daily as needed for allergies   omeprazole (PRILOSEC) 20 MG DR capsule Past Week at AM Self Yes Yes   Sig: Take 20 mg by mouth daily   predniSONE (DELTASONE) 10 MG tablet 8/18/2021 at AM Self Yes Yes   Sig: Take 20 mg by mouth 2 times daily    tiotropium (SPIRIVA) 18 MCG inhaled capsule 8/18/2021 at AM Self Yes Yes   Sig: Inhale 18 mcg into the lungs daily   traZODone (DESYREL) 100 MG tablet Past Week at PM Self Yes Yes   Sig: Take 150 mg by mouth At Bedtime       Facility-Administered Medications: None     Allergies   Allergies   Allergen Reactions     Sulfa Drugs      Other reaction(s): Seizures  Was reported to her by family as a child     Flu Virus Vaccine Swelling     Pneumococcal Polysaccharides Swelling       Physical Exam   Vital Signs: Temp: 97.2  F (36.2  C) Temp src: Tympanic BP: 129/89 Pulse: 86   Resp: 22 SpO2: 90 % O2 Device: Nasal cannula Oxygen Delivery: 6 LPM  Weight: 219 lbs 9.25 oz    Constitutional: In mild respiratory distress  Eyes: pupils reactive, extraocular movements intact. Anicteric sclera.   HEENT: TM's normal, oropharynx nonerythematous. Neck supple, no JVD.  Respiratory: crackles  Cardiovascular: regular, no murmur. No lower extremity edema.  GI: soft, non-tender, bowel sounds present.  Lymph/Hematologic: no  cervical or supraclavicular LAD.  Genitourinary: deferred  Skin: no rashes, or sores  Musculoskeletal: no joint erythema or swelling  Neurologic: cranial nerves symmetric. Neuro exam nonfocal  Psychiatric: alert and oriented x3. Interactive.     Data   Data reviewed today: I reviewed all medications, new labs and imaging results over the last 24 hours. I personally reviewed the chest x-ray image(s) showing patchy infiltrates.    Recent Labs   Lab 08/19/21  1001   WBC 7.2   HGB 14.4   MCV 90         POTASSIUM 4.2   CHLORIDE 95*   CO2 32*   BUN 13   CR 0.88   ANIONGAP 8   JUDY 8.8   *   ALBUMIN 3.6   PROTTOTAL 7.1   BILITOTAL 0.6   ALKPHOS 28*   ALT 20   AST 34     Recent Results (from the past 24 hour(s))   XR Chest Port 1 View    Narrative    Exam:  XR CHEST PORT 1 VIEW    HISTORY: Dyspnea/SOB.    COMPARISON:  2/11/2014    FINDINGS:     The cardiomediastinal contours are stable.     There are multifocal patchy opacities in the mid to lower lungs  bilaterally. No pleural effusion or pneumothorax.      No acute osseous abnormality. No subdiaphragmatic free air.      Impression    IMPRESSION:      Multifocal patchy opacities in the mid to lower lungs bilaterally,  which may be due to aspiration or pneumonia.    LORENZO TAVERA MD         SYSTEM ID:  [id number]

## 2021-08-19 NOTE — PLAN OF CARE
Problem: Adult Inpatient Plan of Care  Goal: Absence of Hospital-Acquired Illness or Injury  Intervention: Identify and Manage Fall Risk  Recent Flowsheet Documentation  Taken 8/19/2021 1656 by Lisa Neely RN  Safety Promotion/Fall Prevention: safety round/check completed  Taken 8/19/2021 1550 by Lisa Neely RN  Safety Promotion/Fall Prevention:   nonskid shoes/slippers when out of bed   patient and family education   patient video monitoring   safety round/check completed  Intervention: Prevent Skin Injury  Recent Flowsheet Documentation  Taken 8/19/2021 1550 by Lisa Neely RN  Body Position: position changed independently  Intervention: Prevent and Manage VTE (Venous Thromboembolism) Risk  Recent Flowsheet Documentation  Taken 8/19/2021 1550 by Lisa Neely RN  VTE Prevention/Management: ambulation promoted  Intervention: Prevent Infection  Recent Flowsheet Documentation  Taken 8/19/2021 1550 by Lisa Neely RN  Infection Prevention:   hand hygiene promoted   rest/sleep promoted   personal protective equipment utilized   single patient room provided     Problem: Gas Exchange Impaired  Goal: Optimal Gas Exchange  Outcome: No Change  Intervention: Optimize Oxygenation and Ventilation  Recent Flowsheet Documentation  Taken 8/19/2021 1550 by Lisa Neely RN  Head of Bed (HOB) Positioning: HOB at 15 degrees

## 2021-08-19 NOTE — PHARMACY-ADMISSION MEDICATION HISTORY
Pharmacy -- Admission Medication Reconciliation    Prior to admission (PTA) medications were reviewed and the patient's PTA medication list was updated.    Sources Consulted: [facility] refill history, sure scripts, chart review, patient phone interview, Marcelina orr at [facility]     The reliability of this Medication Reconciliation is: Reliability: Reliable    The following significant changes were made:    Added discharge pharmacy    Updated B12 directions    Updated trazodone dose    Updated hydroxyzine directions    Removed ranitidine    Removed cymbalta    Removed duonebs    Removed nicotine patch--quit a year ago    Added omeprazole    Added spiriva    Added wellbutrin    Added wixela    Added prednisone--started 8/17, prescribed from [place] [facility]    Added albuterol nebs    Added pataday drops    Added lidoderm patch        **was seen in [place] [facility] on 8/17.  Patient received a dose of the study drug casirivimab/imdevimab    **patient states she is taking prednisone 10 mg daily.  She has taken three doses.  According to chart review from 8/17, patient had two rx's sent to [facility] in [place] and dose should be 20 mg twice daily.  Unable to confirm, spoke with marcelina tech at [facility] who could not find [facility] rx directions.    **patient has not taken her oral medications in a few days due to not feeling well    In addition, the patient's allergies were reviewed with the patient and updated as follows:   Allergies: Sulfa drugs, Flu virus vaccine, and Pneumococcal polysaccharides    The pharmacist has reviewed with the patient that all personal medications should be removed from the building or locked in the belongings safe.  Patient shall only take medications ordered by the physician and administered by the nursing staff.       Medication barriers identified: prednisone dose not taken as prescribed   Medication adherence concerns: none   Understanding of emergency medications: has  inhalers at home, would like a refill of albuterol nebs upon discharge    Sonia Bravo [facility], 8/19/2021,  11:18 AM

## 2021-08-19 NOTE — PROGRESS NOTES
" NSG ADMISSION NOTE    Patient admitted to room 918 at approximately 1135 via cart from emergency room. Patient was accompanied by nurse.     Verbal SBAR report received from Hui prior to patient arrival.     Patient ambulated to bed with stand-by assist. Patient alert and oriented X 3. The patient is not having any pain.  . Admission vital signs: Blood pressure 129/89, pulse 86, temperature 97.2  F (36.2  C), temperature source Tympanic, resp. rate 22, height 1.626 m (5' 4\"), weight 99.6 kg (219 lb 9.3 oz), SpO2 90 %. Patient was oriented to plan of care, call light, bed controls, tv, telephone, bathroom and visiting hours.     Risk Assessment    The following safety risks were identified during admission: fall and isolation. Yellow risk band applied: YES.     Skin Initial Assessment    This writer admitted this patient and completed a full skin assessment and Myron score in the Adult PCS flowsheet. Appropriate interventions initiated as needed    Myron Risk Assessment  Sensory Perception: 4-->no impairment  Moisture: 4-->rarely moist  Activity: 3-->walks occasionally  Mobility: 4-->no limitation  Nutrition: 3-->adequate  Friction and Shear: 3-->no apparent problem  Myron Score: 21  Mattress: Standard Hospital Mattress (Foam)  Bed Frame: Standard width and length    Education    Patient has a [place] to Observation order: No  Observation education completed and documented: N/A      Kirstie Hatfield RN    "

## 2021-08-19 NOTE — ED TRIAGE NOTES
EMS Arrival Note  ________________________________  Prisca Casillas is a 70 year old Female that arrives via [facility] ambulance [facility]  Pre hospital clinical presentation per EMS personnel includes SOB, covid positive, when EMS arrived sats 84%RA, hx of asthma.  Pre hospital personnel report vital signs of:  B/P 152/99; HR 98, RR 24;SpO2 84 ra  Pre Hospital Cardiac rhythm reported as Normal Sinus  Pre hospital care included: , Respiratory Support: O2 @ 15 L/min NRB Patient arrives with:  GCS Total = 15  Airway intact  Breathing Assessment Abnormal - NRB applied  Circulation Assessment Normal  Pt refused IV  Placed in room 02, gowned, warm blanket provided, side rails up,  ID verified and band placed, and call light within reach.

## 2021-08-19 NOTE — PROGRESS NOTES
Patient has a poor appetite, ordered yogurt with granola; placed food at bedside as patient is now sleeping.  She states that she has not had much sleep.  She is currently 93-94% on 6L, respirations are even, no accessory muscle use.  Will continue to monitor.

## 2021-08-19 NOTE — ED PROVIDER NOTES
History     Chief Complaint   Patient presents with     Shortness of Breath     Covid Concern     HPI  Prisca Casillas is a 70 year old female who has been feeling ill for about a week now.  She was seen in the emergency department in [place] 3 days ago and diagnosed with COVID-19.  She did receive Casirivimab and imdevimab while there.  She is an oxygen dependent COPD patient, has been on her oxygen and nebulizers at home.  Also was given a course of prednisone while in the emergency department.  She does not monitor her oxygen levels at home but has been getting more short of breath over the last 3 days.  She called the ambulance and walked out to meet them.  She was 83% oxygen saturation when they first checked her.  With nonrebreather she came up to the mid 90s.  She is feeling weak tired, she was pale and diaphoretic when she was picked up by paramedics.  Does have something of a cough.  No other complaints.  She is unvaccinated.    Allergies:  Allergies   Allergen Reactions     Sulfa Drugs      Other reaction(s): Seizures  Was reported to her by family as a child     Flu Virus Vaccine Swelling     Pneumococcal Polysaccharides Swelling       Problem List:    Patient Active Problem List    Diagnosis Date Noted     Pneumonia due to COVID-19 virus 08/19/2021     Priority: Medium     2019 novel coronavirus disease (COVID-19) 08/19/2021     Priority: Medium     Dysphagia 01/22/2014     Priority: Medium     Tobacco abuse 01/22/2014     Priority: Medium        Past Medical History:    Past Medical History:   Diagnosis Date     Anxiety state      Constipation      Diaphragmatic hernia      Diverticulosis of colon      History of colonic polyps      Insomnia      Meniere's disease      Pain in thoracic spine      Personal history of malignant neoplasm of cervix uteri        Past Surgical History:    Past Surgical History:   Procedure Laterality Date     ATTEMPTED ARTHROSCOPY      knee     COLONOSCOPY      2011      ESOPHAGOSCOPY, GASTROSCOPY, DUODENOSCOPY (EGD), COMBINED      1/23/2014     EXCISE CYST GENERIC (LOCATION)      arm     EXTRACAPSULAR CATARACT EXTRATION WITH INTRAOCULAR LENS IMPLANT      2013     HYSTERECTOMY VAGINAL      at age 31     OTHER SURGICAL HISTORY      [id number],OTHER,in her 40's       Family History:    Family History   Problem Relation Age of Onset     Cancer Mother         Cancer     Breast Cancer Mother         Cancer-breast     Breast Cancer Maternal Aunt         Cancer-breast       Social History:  Marital Status:   [2]  Social History     Tobacco Use     Smoking status: Current Every Day Smoker     Packs/day: 0.50     Years: 52.00     Pack years: 26.00     Types: Cigarettes     Start date: 1/1/1961     Smokeless tobacco: Never Used   Substance Use Topics     Alcohol use: No     Drug use: Unknown     Types: Other     Comment: Drug use: No        Medications:    albuterol (PROAIR HFA/PROVENTIL HFA/VENTOLIN HFA) 108 (90 Base) MCG/ACT Inhaler  cyanocobalamin (SM VITAMIN B-12) 500 MCG TABS  DULoxetine (CYMBALTA) 20 MG EC capsule  hydrOXYzine (ATARAX) 25 MG tablet  ibuprofen (ADVIL/MOTRIN) 800 MG tablet  ipratropium - albuterol 0.5 mg/2.5 mg/3 mL (DUONEB) 0.5-2.5 (3) MG/3ML neb solution  metoprolol succinate (TOPROL-XL) 100 MG 24 hr tablet  nicotine (NICODERM CQ) 21 MG/24HR 24 hr patch  ranitidine (ZANTAC) 150 MG tablet  traZODone (DESYREL) 100 MG tablet          Review of Systems   Constitutional: Positive for chills, diaphoresis and fever.   HENT: Positive for congestion.    Eyes: Negative for visual disturbance.   Respiratory: Positive for cough and shortness of breath.    Cardiovascular: Negative for chest pain.   Gastrointestinal: Negative for nausea and vomiting.   Genitourinary: Negative for dysuria.   Musculoskeletal: Negative for arthralgias.   Skin: Negative for rash.   Neurological: Negative for light-headedness.   Psychiatric/Behavioral: Negative for agitation.       Physical Exam   BP:  "(!) 157/98  Pulse: 99  Temp: (!) 101.6  F (38.7  C)  Resp: 20  Height: 162.6 cm (5' 4\")  Weight: 99.7 kg (219 lb 11.2 oz)  SpO2: 94 %      Physical Exam  Vitals and nursing note reviewed.   Constitutional:       Appearance: She is well-developed.   HENT:      Head: Normocephalic and atraumatic.      Mouth/Throat:      Mouth: Mucous membranes are moist.   Eyes:      Conjunctiva/sclera: Conjunctivae normal.   Cardiovascular:      Rate and Rhythm: Normal rate and regular rhythm.      Heart sounds: Normal heart sounds.   Pulmonary:      Effort: Tachypnea present.      Breath sounds: Normal breath sounds and air entry. No transmitted upper airway sounds. No decreased breath sounds, wheezing, rhonchi or rales.   Abdominal:      General: Abdomen is flat.   Skin:     General: Skin is warm and dry.   Neurological:      Mental Status: She is alert and oriented to person, place, and time.   Psychiatric:         Behavior: Behavior normal.         ED Course        Procedures                Results for orders placed or performed during the hospital encounter of 08/19/21 (from the past 24 hour(s))   CBC with platelets differential    Narrative    The following orders were created for panel order CBC with platelets differential.  Procedure                               Abnormality         Status                     ---------                               -----------         ------                     CBC with platelets and d...[[id number]]  Abnormal            Final result                 Please view results for these tests on the individual orders.   Comprehensive metabolic panel   Result Value Ref Range    Sodium 135 134 - 144 mmol/L    Potassium 4.2 3.5 - 5.1 mmol/L    Chloride 95 (L) 98 - 107 mmol/L    Carbon Dioxide (CO2) 32 (H) 21 - 31 mmol/L    Anion Gap 8 3 - 14 mmol/L    Urea Nitrogen 13 7 - 25 mg/dL    Creatinine 0.88 0.60 - 1.20 mg/dL    Calcium 8.8 8.6 - 10.3 mg/dL    Glucose 157 (H) 70 - 105 mg/dL    Alkaline " Phosphatase 28 (L) 34 - 104 U/L    AST 34 13 - 39 U/L    ALT 20 7 - 52 U/L    Protein Total 7.1 6.4 - 8.9 g/dL    Albumin 3.6 3.5 - 5.7 g/dL    Bilirubin Total 0.6 0.3 - 1.0 mg/dL    GFR Estimate 66 >60 mL/min/1.73m2   Magnesium   Result Value Ref Range    Magnesium 2.0 1.9 - 2.7 mg/dL   Blood gas venous and oxyhgb   Result Value Ref Range    pH Venous 7.45 (H) 7.32 - 7.43    pCO2 Venous 48 40 - 50 mm Hg    pO2 Venous 35 25 - 47 mm Hg    Bicarbonate Venous 34 (H) 21 - 28 mmol/L    FIO2 64     Oxyhemoglobin Venous 70 70 - 75 %    Base Excess/Deficit (+/-) 8.0 (H) -7.7 - 1.9 mmol/L   Troponin I   Result Value Ref Range    Troponin I 13.7 0.0 - 34.0 pg/mL   Extra Tube    Narrative    The following orders were created for panel order Extra Tube.  Procedure                               Abnormality         Status                     ---------                               -----------         ------                     Extra Blue Top Tube[[id number]]                              In process                 Extra Serum Separator Tu...[[id number]]                      In process                   Please view results for these tests on the individual orders.   CBC with platelets and differential   Result Value Ref Range    WBC Count 7.2 4.0 - 11.0 10e3/uL    RBC Count 4.83 3.80 - 5.20 10e6/uL    Hemoglobin 14.4 11.7 - 15.7 g/dL    Hematocrit 43.5 35.0 - 47.0 %    MCV 90 78 - 100 fL    MCH 29.8 26.5 - 33.0 pg    MCHC 33.1 31.5 - 36.5 g/dL    RDW 13.8 10.0 - 15.0 %    Platelet Count 156 150 - 450 10e3/uL    % Neutrophils 89 %    % Lymphocytes 7 %    % Monocytes 4 %    % Eosinophils 0 %    % Basophils 0 %    % Immature Granulocytes 0 %    NRBCs per 100 WBC 0 <1 /100    Absolute Neutrophils 6.4 1.6 - 8.3 10e3/uL    Absolute Lymphocytes 0.5 (L) 0.8 - 5.3 10e3/uL    Absolute Monocytes 0.3 0.0 - 1.3 10e3/uL    Absolute Eosinophils 0.0 0.0 - 0.7 10e3/uL    Absolute Basophils 0.0 0.0 - 0.2 10e3/uL    Absolute Immature Granulocytes 0.0  <=0.0 10e3/uL    Absolute NRBCs 0.0 10e3/uL   XR Chest Port 1 View    Narrative    Exam:  XR CHEST PORT 1 VIEW    HISTORY: Dyspnea/SOB.    COMPARISON:  2/11/2014    FINDINGS:     The cardiomediastinal contours are stable.     There are multifocal patchy opacities in the mid to lower lungs  bilaterally. No pleural effusion or pneumothorax.      No acute osseous abnormality. No subdiaphragmatic free air.      Impression    IMPRESSION:      Multifocal patchy opacities in the mid to lower lungs bilaterally,  which may be due to aspiration or pneumonia.    LORENZO TAVERA MD         SYSTEM ID:  [id number]       Medications   sodium chloride (PF) 0.9% PF flush 3 mL (has no administration in time range)   sodium chloride (PF) 0.9% PF flush 3 mL ( Intracatheter Canceled Entry 8/19/21 0955)   lactated ringers infusion ( Intravenous New Bag 8/19/21 0956)   methylPREDNISolone sodium succinate (solu-MEDROL) injection 125 mg (has no administration in time range)   acetaminophen (TYLENOL) tablet 650 mg (650 mg Oral Given 8/19/21 0955)     Or   acetaminophen (TYLENOL) Suppository 650 mg ( Rectal See Alternative 8/19/21 0955)   albuterol (PROAIR HFA/PROVENTIL HFA/VENTOLIN HFA) 108 (90 Base) MCG/ACT inhaler 6 puff (6 puffs Inhalation Given 8/19/21 1007)       Assessments & Plan (with Medical Decision Making)     I have reviewed the nursing notes.    I have reviewed the findings, diagnosis, plan and need for follow up with the patient.  Patient with COVID-19, was given monoclonal antibodies in the emergency department in [place] a few days ago.  Worsening status at home, came in by ambulance on nonrebreather.  Labs reassuring, however x-ray does show significant viral-looking pneumonia.  Will admit her to the intensive care unit, Dr. Camilo has accepted.  We will get her started on some Solu-Medrol here, he will order remdesivir in the unit.    New Prescriptions    No medications on file       Final diagnoses:   2019 novel  coronavirus disease (COVID-19)       8/19/2021   [facility]     Jonatan Norman MD  08/19/21 3884

## 2021-08-20 LAB
ANION GAP SERPL CALCULATED.3IONS-SCNC: 9 MMOL/L (ref 3–14)
BUN SERPL-MCNC: 17 MG/DL (ref 7–25)
CALCIUM SERPL-MCNC: 8.3 MG/DL (ref 8.6–10.3)
CHLORIDE BLD-SCNC: 98 MMOL/L (ref 98–107)
CO2 SERPL-SCNC: 31 MMOL/L (ref 21–31)
CREAT SERPL-MCNC: 0.69 MG/DL (ref 0.6–1.2)
CRP SERPL-MCNC: 76 MG/L
D DIMER PPP FEU-MCNC: 0.98 UG/ML FEU (ref 0–0.5)
ERYTHROCYTE [DISTWIDTH] IN BLOOD BY AUTOMATED COUNT: 13.6 % (ref 10–15)
FIBRINOGEN PPP-MCNC: 635 MG/DL (ref 170–490)
GFR SERPL CREATININE-BSD FRML MDRD: 88 ML/MIN/1.73M2
GLUCOSE BLD-MCNC: 155 MG/DL (ref 70–105)
HCT VFR BLD AUTO: 42.7 % (ref 35–47)
HGB BLD-MCNC: 14 G/DL (ref 11.7–15.7)
MCH RBC QN AUTO: 29.4 PG (ref 26.5–33)
MCHC RBC AUTO-ENTMCNC: 32.8 G/DL (ref 31.5–36.5)
MCV RBC AUTO: 90 FL (ref 78–100)
PLATELET # BLD AUTO: 157 10E3/UL (ref 150–450)
POTASSIUM BLD-SCNC: 4.2 MMOL/L (ref 3.5–5.1)
RBC # BLD AUTO: 4.77 10E6/UL (ref 3.8–5.2)
SODIUM SERPL-SCNC: 138 MMOL/L (ref 134–144)
WBC # BLD AUTO: 4 10E3/UL (ref 4–11)

## 2021-08-20 PROCEDURE — 85379 FIBRIN DEGRADATION QUANT: CPT | Performed by: INTERNAL MEDICINE

## 2021-08-20 PROCEDURE — 250N000011 HC RX IP 250 OP 636: Performed by: INTERNAL MEDICINE

## 2021-08-20 PROCEDURE — 94640 AIRWAY INHALATION TREATMENT: CPT

## 2021-08-20 PROCEDURE — 85384 FIBRINOGEN ACTIVITY: CPT | Performed by: INTERNAL MEDICINE

## 2021-08-20 PROCEDURE — 82310 ASSAY OF CALCIUM: CPT | Performed by: INTERNAL MEDICINE

## 2021-08-20 PROCEDURE — 999N000157 HC STATISTIC RCP TIME EA 10 MIN

## 2021-08-20 PROCEDURE — 250N000009 HC RX 250: Performed by: INTERNAL MEDICINE

## 2021-08-20 PROCEDURE — 250N000013 HC RX MED GY IP 250 OP 250 PS 637: Performed by: INTERNAL MEDICINE

## 2021-08-20 PROCEDURE — 250N000012 HC RX MED GY IP 250 OP 636 PS 637: Performed by: INTERNAL MEDICINE

## 2021-08-20 PROCEDURE — 94640 AIRWAY INHALATION TREATMENT: CPT | Mod: 76

## 2021-08-20 PROCEDURE — 200N000001 HC R&B ICU

## 2021-08-20 PROCEDURE — 99233 SBSQ HOSP IP/OBS HIGH 50: CPT | Performed by: INTERNAL MEDICINE

## 2021-08-20 PROCEDURE — 85027 COMPLETE CBC AUTOMATED: CPT | Performed by: INTERNAL MEDICINE

## 2021-08-20 PROCEDURE — 86140 C-REACTIVE PROTEIN: CPT | Performed by: INTERNAL MEDICINE

## 2021-08-20 PROCEDURE — 258N000003 HC RX IP 258 OP 636: Performed by: EMERGENCY MEDICINE

## 2021-08-20 PROCEDURE — 258N000003 HC RX IP 258 OP 636: Performed by: INTERNAL MEDICINE

## 2021-08-20 PROCEDURE — 36415 COLL VENOUS BLD VENIPUNCTURE: CPT | Performed by: INTERNAL MEDICINE

## 2021-08-20 RX ORDER — ALBUTEROL SULFATE 90 UG/1
2 AEROSOL, METERED RESPIRATORY (INHALATION) ONCE
Status: COMPLETED | OUTPATIENT
Start: 2021-08-20 | End: 2021-08-20

## 2021-08-20 RX ADMIN — ENOXAPARIN SODIUM 50 MG: 60 INJECTION SUBCUTANEOUS at 09:10

## 2021-08-20 RX ADMIN — ALBUTEROL SULFATE 2 PUFF: 90 AEROSOL, METERED RESPIRATORY (INHALATION) at 14:49

## 2021-08-20 RX ADMIN — TRAZODONE HYDROCHLORIDE 150 MG: 50 TABLET ORAL at 21:05

## 2021-08-20 RX ADMIN — BUPROPION HYDROCHLORIDE 150 MG: 150 TABLET, FILM COATED, EXTENDED RELEASE ORAL at 09:11

## 2021-08-20 RX ADMIN — METOPROLOL SUCCINATE 100 MG: 100 TABLET, EXTENDED RELEASE ORAL at 09:11

## 2021-08-20 RX ADMIN — IPRATROPIUM BROMIDE 2 PUFF: 17 AEROSOL, METERED RESPIRATORY (INHALATION) at 14:52

## 2021-08-20 RX ADMIN — SODIUM CHLORIDE, POTASSIUM CHLORIDE, SODIUM LACTATE AND CALCIUM CHLORIDE: 600; 310; 30; 20 INJECTION, SOLUTION INTRAVENOUS at 17:15

## 2021-08-20 RX ADMIN — FAMOTIDINE 20 MG: 20 TABLET ORAL at 21:05

## 2021-08-20 RX ADMIN — FAMOTIDINE 20 MG: 20 TABLET ORAL at 09:11

## 2021-08-20 RX ADMIN — TIOTROPIUM BROMIDE INHALATION SPRAY 2 PUFF: 3.12 SPRAY, METERED RESPIRATORY (INHALATION) at 06:11

## 2021-08-20 RX ADMIN — ALBUTEROL SULFATE 2 PUFF: 90 AEROSOL, METERED RESPIRATORY (INHALATION) at 11:03

## 2021-08-20 RX ADMIN — DEXAMETHASONE 6 MG: 2 TABLET ORAL at 12:04

## 2021-08-20 RX ADMIN — IPRATROPIUM BROMIDE 2 PUFF: 17 AEROSOL, METERED RESPIRATORY (INHALATION) at 20:21

## 2021-08-20 RX ADMIN — IPRATROPIUM BROMIDE 2 PUFF: 17 AEROSOL, METERED RESPIRATORY (INHALATION) at 11:03

## 2021-08-20 RX ADMIN — ACETAMINOPHEN 975 MG: 325 TABLET, FILM COATED ORAL at 08:07

## 2021-08-20 RX ADMIN — ALBUTEROL SULFATE 2 PUFF: 90 INHALANT RESPIRATORY (INHALATION) at 01:27

## 2021-08-20 RX ADMIN — ACETAMINOPHEN 975 MG: 325 TABLET, FILM COATED ORAL at 15:13

## 2021-08-20 RX ADMIN — IPRATROPIUM BROMIDE 2 PUFF: 17 AEROSOL, METERED RESPIRATORY (INHALATION) at 06:10

## 2021-08-20 RX ADMIN — ALBUTEROL SULFATE 2 PUFF: 90 AEROSOL, METERED RESPIRATORY (INHALATION) at 20:21

## 2021-08-20 RX ADMIN — SODIUM CHLORIDE, POTASSIUM CHLORIDE, SODIUM LACTATE AND CALCIUM CHLORIDE: 600; 310; 30; 20 INJECTION, SOLUTION INTRAVENOUS at 08:06

## 2021-08-20 RX ADMIN — FLUTICASONE FUROATE AND VILANTEROL TRIFENATATE 1 PUFF: 200; 25 POWDER RESPIRATORY (INHALATION) at 06:11

## 2021-08-20 RX ADMIN — REMDESIVIR 100 MG: 100 INJECTION, POWDER, LYOPHILIZED, FOR SOLUTION INTRAVENOUS at 15:14

## 2021-08-20 RX ADMIN — ENOXAPARIN SODIUM 50 MG: 60 INJECTION SUBCUTANEOUS at 21:08

## 2021-08-20 RX ADMIN — ALBUTEROL SULFATE 2 PUFF: 90 AEROSOL, METERED RESPIRATORY (INHALATION) at 06:11

## 2021-08-20 ASSESSMENT — ACTIVITIES OF DAILY LIVING (ADL)
ADLS_ACUITY_SCORE: 15
ADLS_ACUITY_SCORE: 15
ADLS_ACUITY_SCORE: 19
ADLS_ACUITY_SCORE: 20
ADLS_ACUITY_SCORE: 15
ADLS_ACUITY_SCORE: 20

## 2021-08-20 ASSESSMENT — MIFFLIN-ST. JEOR: SCORE: 1621.75

## 2021-08-20 NOTE — PROGRESS NOTES
SAFETY CHECKLIST  ID Bands and Risk clasps correct and in place (DNR, Fall risk, Allergy, Latex, Limb):  Yes  All Lines Reconciled and labeled correctly: Yes  Whiteboard updated:Yes  Environmental interventions (bed/chair alarm on, call light, side rails, restraints, sitter....): Yes    Resting in bed. Denies any pain. SOB with movement. Will continue to monitor.

## 2021-08-20 NOTE — PROGRESS NOTES
[facility]    Medicine Progress Note - Hospitalist Service       Date of Admission:  8/19/2021    Assessment & Plan              Prisca Casillas is a 70 year old female admitted on 8/19/2021. She has acute respiratory failure secondary to covid.     Principal Problem:    Acute on chronic respiratory failure with hypoxia (H)  Assessment: secondary to covid, requiring increase in oxygen need from her baseline 3 liters, to 6 liters. Not vaccinated. Now down to 4.5 liters. Improving.   Plan: Day 2 dexamethasone, remdesevir, lovenox, supplemental oxygen.     Active Problems:    Pneumonia due to COVID-19 virus  Assessment: present on admission   Plan: as above      Essential hypertension  Assessment: chronic  Plan: continue toprol      COPD with asthma (H)  Assessment: chronic  Plan: continue inhaled meds.       Diet: Combination Diet Regular Diet Adult    DVT Prophylaxis: Enoxaparin (Lovenox) SQ  Tirado Catheter: Not present  Central Lines: None  Code Status: Full Code      Disposition Plan   Expected discharge: 2-3 days, recommended to prior living arrangement once O2 use less than 4 liters/minute.     The patient's care was discussed with the Patient.    Ezra Camilo MD  Hospitalist Service  [facility]  Securely message with the Vocera Web Console (learn more here)  Text page via [employer] Paging/Directory        Interval History   Doing OK     Data reviewed today: I reviewed all medications, new labs and imaging results over the last 24 hours. I personally reviewed no images or EKG's today.    Physical Exam   Vital Signs: Temp: 97.3  F (36.3  C) Temp src: Temporal BP: 135/72 Pulse: 61   Resp: 18 SpO2: 92 % O2 Device: Nasal cannula Oxygen Delivery: 5 LPM  Weight: 247 lbs 4.8 oz  GENERAL: Comfortable, talkative, in no apparent distress.  HEENT: Anicteric, non-injected sclera, mouth moist.   NECK: No JVD.  CARDIOVASCULAR: regular rate and rhythm, no murmur. No lower extremity  edema   RESPIRATORY: Clear to auscultation bilaterally, no wheezes, no crackles.  GI: Non-distended, normal bowel sounds, soft, non-tender.  SKIN: No rashes, sores.   NEUROLOGY: Alert and oriented x3, follows commands, speech and language normal.       Data   Recent Labs   Lab 08/20/21  0625 08/19/21  1001   WBC 4.0 7.2   HGB 14.0 14.4   MCV 90 90    156    135   POTASSIUM 4.2 4.2   CHLORIDE 98 95*   CO2 31 32*   BUN 17 13   CR 0.69 0.88   ANIONGAP 9 8   JUDY 8.3* 8.8   * 157*   ALBUMIN  --  3.6   PROTTOTAL  --  7.1   BILITOTAL  --  0.6   ALKPHOS  --  28*   ALT  --  20   AST  --  34     Medications     lactated ringers 100 mL/hr at 08/20/21 0806     - MEDICATION INSTRUCTIONS -         remdesivir  100 mg Intravenous Q24H    And     sodium chloride 0.9%  50 mL Intravenous Q24H     acetaminophen  975 mg Oral Q8H     albuterol  2 puff Inhalation 4x Daily     buPROPion  150 mg Oral Daily     dexamethasone  6 mg Oral Daily     enoxaparin ANTICOAGULANT  50 mg Subcutaneous Q12H     famotidine  20 mg Oral BID     fluticasone-vilanterol  1 puff Inhalation Daily     ipratropium  2 puff Inhalation 4x daily     lidocaine   Transdermal Q8H     metoprolol succinate ER  100 mg Oral Daily     sodium chloride (PF)  3 mL Intracatheter Q8H     tiotropium  2 puff Inhalation Daily     traZODone  150 mg Oral At Bedtime

## 2021-08-20 NOTE — PLAN OF CARE
Lung sounds clear and diminished throughout with occasional expiratory wheezes in upper lobes. On 6L of oxygen via nasal cannula. Patient diaphoretic over night. Afebrile. Reports SOB with activity. Able to ambulate to bathroom and back to bed. Denies any pain. Will continue to monitor.

## 2021-08-20 NOTE — PROGRESS NOTES
Shift summary:    Pts:  RR 20 - 24 bpm  Work of Breathing: No respiratory distress noted at rest  Breath sounds Expiratory wheezes and crackles at the bases.  HR >60 bpm  SpO2 93% on 6 lpm  Weaning needs: Patient chronically on 3 lpm at home.  Respiratory Home Equipment Needs: none  Plan: continue current treatment and wean O2 as able.

## 2021-08-21 LAB
ANION GAP SERPL CALCULATED.3IONS-SCNC: 6 MMOL/L (ref 3–14)
BUN SERPL-MCNC: 18 MG/DL (ref 7–25)
CALCIUM SERPL-MCNC: 8.4 MG/DL (ref 8.6–10.3)
CHLORIDE BLD-SCNC: 98 MMOL/L (ref 98–107)
CO2 SERPL-SCNC: 31 MMOL/L (ref 21–31)
CREAT SERPL-MCNC: 0.66 MG/DL (ref 0.6–1.2)
CRP SERPL-MCNC: 23 MG/L
D DIMER PPP FEU-MCNC: 0.63 UG/ML FEU (ref 0–0.5)
ERYTHROCYTE [DISTWIDTH] IN BLOOD BY AUTOMATED COUNT: 13.5 % (ref 10–15)
FIBRINOGEN PPP-MCNC: 446 MG/DL (ref 170–490)
GFR SERPL CREATININE-BSD FRML MDRD: 89 ML/MIN/1.73M2
GLUCOSE BLD-MCNC: 141 MG/DL (ref 70–105)
HCT VFR BLD AUTO: 37.5 % (ref 35–47)
HGB BLD-MCNC: 12.4 G/DL (ref 11.7–15.7)
MCH RBC QN AUTO: 29.9 PG (ref 26.5–33)
MCHC RBC AUTO-ENTMCNC: 33.1 G/DL (ref 31.5–36.5)
MCV RBC AUTO: 90 FL (ref 78–100)
PLATELET # BLD AUTO: 165 10E3/UL (ref 150–450)
POTASSIUM BLD-SCNC: 4.3 MMOL/L (ref 3.5–5.1)
RBC # BLD AUTO: 4.15 10E6/UL (ref 3.8–5.2)
SODIUM SERPL-SCNC: 135 MMOL/L (ref 134–144)
WBC # BLD AUTO: 4 10E3/UL (ref 4–11)

## 2021-08-21 PROCEDURE — 85384 FIBRINOGEN ACTIVITY: CPT | Performed by: INTERNAL MEDICINE

## 2021-08-21 PROCEDURE — 250N000011 HC RX IP 250 OP 636: Performed by: INTERNAL MEDICINE

## 2021-08-21 PROCEDURE — 250N000012 HC RX MED GY IP 250 OP 636 PS 637: Performed by: INTERNAL MEDICINE

## 2021-08-21 PROCEDURE — 99232 SBSQ HOSP IP/OBS MODERATE 35: CPT | Performed by: INTERNAL MEDICINE

## 2021-08-21 PROCEDURE — 200N000001 HC R&B ICU

## 2021-08-21 PROCEDURE — 86140 C-REACTIVE PROTEIN: CPT | Performed by: INTERNAL MEDICINE

## 2021-08-21 PROCEDURE — 258N000003 HC RX IP 258 OP 636: Performed by: INTERNAL MEDICINE

## 2021-08-21 PROCEDURE — 94640 AIRWAY INHALATION TREATMENT: CPT

## 2021-08-21 PROCEDURE — 250N000013 HC RX MED GY IP 250 OP 250 PS 637: Performed by: INTERNAL MEDICINE

## 2021-08-21 PROCEDURE — 82310 ASSAY OF CALCIUM: CPT | Performed by: INTERNAL MEDICINE

## 2021-08-21 PROCEDURE — 94640 AIRWAY INHALATION TREATMENT: CPT | Mod: 76

## 2021-08-21 PROCEDURE — 85379 FIBRIN DEGRADATION QUANT: CPT | Performed by: INTERNAL MEDICINE

## 2021-08-21 PROCEDURE — 250N000009 HC RX 250: Performed by: INTERNAL MEDICINE

## 2021-08-21 PROCEDURE — 85014 HEMATOCRIT: CPT | Performed by: INTERNAL MEDICINE

## 2021-08-21 PROCEDURE — 999N000157 HC STATISTIC RCP TIME EA 10 MIN

## 2021-08-21 PROCEDURE — 999N000105 HC STATISTIC NO DOCUMENTATION TO SUPPORT CHARGE

## 2021-08-21 PROCEDURE — 36415 COLL VENOUS BLD VENIPUNCTURE: CPT | Performed by: INTERNAL MEDICINE

## 2021-08-21 RX ORDER — LORAZEPAM 2 MG/ML
1 INJECTION INTRAMUSCULAR EVERY 4 HOURS
Status: DISCONTINUED | OUTPATIENT
Start: 2021-08-21 | End: 2021-08-21

## 2021-08-21 RX ORDER — OLANZAPINE 10 MG/2ML
5 INJECTION, POWDER, FOR SOLUTION INTRAMUSCULAR DAILY PRN
Status: DISCONTINUED | OUTPATIENT
Start: 2021-08-21 | End: 2021-08-22 | Stop reason: HOSPADM

## 2021-08-21 RX ORDER — LORAZEPAM 2 MG/ML
1 INJECTION INTRAMUSCULAR EVERY 4 HOURS PRN
Status: DISCONTINUED | OUTPATIENT
Start: 2021-08-21 | End: 2021-08-22 | Stop reason: HOSPADM

## 2021-08-21 RX ORDER — LORAZEPAM 2 MG/ML
2 INJECTION INTRAMUSCULAR ONCE
Status: COMPLETED | OUTPATIENT
Start: 2021-08-21 | End: 2021-08-21

## 2021-08-21 RX ADMIN — IPRATROPIUM BROMIDE 2 PUFF: 17 AEROSOL, METERED RESPIRATORY (INHALATION) at 07:38

## 2021-08-21 RX ADMIN — IPRATROPIUM BROMIDE 2 PUFF: 17 AEROSOL, METERED RESPIRATORY (INHALATION) at 11:02

## 2021-08-21 RX ADMIN — ACETAMINOPHEN 975 MG: 325 TABLET, FILM COATED ORAL at 07:27

## 2021-08-21 RX ADMIN — IPRATROPIUM BROMIDE 2 PUFF: 17 AEROSOL, METERED RESPIRATORY (INHALATION) at 14:47

## 2021-08-21 RX ADMIN — DEXAMETHASONE 6 MG: 2 TABLET ORAL at 13:07

## 2021-08-21 RX ADMIN — ALBUTEROL SULFATE 2 PUFF: 90 AEROSOL, METERED RESPIRATORY (INHALATION) at 07:38

## 2021-08-21 RX ADMIN — ENOXAPARIN SODIUM 50 MG: 60 INJECTION SUBCUTANEOUS at 09:15

## 2021-08-21 RX ADMIN — REMDESIVIR 100 MG: 100 INJECTION, POWDER, LYOPHILIZED, FOR SOLUTION INTRAVENOUS at 16:07

## 2021-08-21 RX ADMIN — FLUTICASONE FUROATE AND VILANTEROL TRIFENATATE 1 PUFF: 200; 25 POWDER RESPIRATORY (INHALATION) at 07:38

## 2021-08-21 RX ADMIN — SODIUM CHLORIDE 50 ML: 9 INJECTION, SOLUTION INTRAVENOUS at 16:06

## 2021-08-21 RX ADMIN — IPRATROPIUM BROMIDE 2 PUFF: 17 AEROSOL, METERED RESPIRATORY (INHALATION) at 18:23

## 2021-08-21 RX ADMIN — ACETAMINOPHEN 975 MG: 325 TABLET, FILM COATED ORAL at 00:55

## 2021-08-21 RX ADMIN — METOPROLOL SUCCINATE 100 MG: 100 TABLET, EXTENDED RELEASE ORAL at 09:15

## 2021-08-21 RX ADMIN — FAMOTIDINE 20 MG: 20 TABLET ORAL at 09:14

## 2021-08-21 RX ADMIN — ALBUTEROL SULFATE 2 PUFF: 90 AEROSOL, METERED RESPIRATORY (INHALATION) at 11:03

## 2021-08-21 RX ADMIN — ALBUTEROL SULFATE 2 PUFF: 90 AEROSOL, METERED RESPIRATORY (INHALATION) at 18:23

## 2021-08-21 RX ADMIN — ACETAMINOPHEN 975 MG: 325 TABLET, FILM COATED ORAL at 16:07

## 2021-08-21 RX ADMIN — LORAZEPAM 2 MG: 2 INJECTION, SOLUTION INTRAMUSCULAR; INTRAVENOUS at 20:10

## 2021-08-21 RX ADMIN — BUPROPION HYDROCHLORIDE 150 MG: 150 TABLET, FILM COATED, EXTENDED RELEASE ORAL at 09:15

## 2021-08-21 RX ADMIN — ALBUTEROL SULFATE 2 PUFF: 90 AEROSOL, METERED RESPIRATORY (INHALATION) at 14:47

## 2021-08-21 RX ADMIN — TIOTROPIUM BROMIDE INHALATION SPRAY 2 PUFF: 3.12 SPRAY, METERED RESPIRATORY (INHALATION) at 07:38

## 2021-08-21 ASSESSMENT — ACTIVITIES OF DAILY LIVING (ADL)
ADLS_ACUITY_SCORE: 21
ADLS_ACUITY_SCORE: 19
ADLS_ACUITY_SCORE: 21
ADLS_ACUITY_SCORE: 21
ADLS_ACUITY_SCORE: 20
ADLS_ACUITY_SCORE: 21

## 2021-08-21 ASSESSMENT — MIFFLIN-ST. JEOR: SCORE: 1627.19

## 2021-08-21 NOTE — PROGRESS NOTES
Shift summary:    Pts:  RR 14  Breath sounds Wheezy  HR 63  SpO2 92 on 3 L  Pt. Received Atrovent & Breo Ellipta inhaler treatments

## 2021-08-21 NOTE — PROGRESS NOTES
Patient remains on 4L NC. Stable sats around 93-94%. Will continue to follow with inhalers as scheduled.     Nelson Bender RRT

## 2021-08-21 NOTE — PROGRESS NOTES
[facility]    Medicine Progress Note - Hospitalist Service       Date of Admission:  8/19/2021    Assessment & Plan                   Prisca Casillas is a 70 year old female admitted on 8/19/2021. She has acute respiratory failure secondary to covid.     Principal Problem:    Acute on chronic respiratory failure with hypoxia (H)  Assessment: secondary to covid, requiring increase in oxygen need from her baseline 3 liters, to 6 liters. Not vaccinated. Now down to 3 liters. Improving.   Plan: Day 3 dexamethasone, remdesevir, lovenox, supplemental oxygen. Consider discharge tomorrow.     Active Problems:    Pneumonia due to COVID-19 virus  Assessment: present on admission   Plan: as above      Essential hypertension  Assessment: chronic  Plan: continue toprol      COPD with asthma (H)  Assessment: chronic  Plan: continue inhaled meds.         Diet: Combination Diet Regular Diet Adult    DVT Prophylaxis: Enoxaparin (Lovenox) SQ  Tirado Catheter: Not present  Central Lines: None  Code Status: Full Code      Disposition Plan   Expected discharge: 1-2 days, recommended to prior living arrangement once O2 use less than 4 liters/minute.     The patient's care was discussed with the Patient.    Ezra Camilo MD  Hospitalist Service  [facility]  Securely message with the Vocera Web Console (learn more here)  Text page via [employer] Paging/[employer]        Interval History   Slept last night. Feeling tired but better.     Data reviewed today: I reviewed all medications, new labs and imaging results over the last 24 hours. I personally reviewed no images or EKG's today.    Physical Exam   Vital Signs: Temp: 98.1  F (36.7  C) Temp src: Temporal BP: (!) 148/73 Pulse: 61   Resp: 27 SpO2: 90 % O2 Device: High Flow Nasal Cannula (HFNC) Oxygen Delivery: 7 LPM  Weight: 248 lbs 8 oz  GENERAL: Comfortable, no apparent distress.  CARDIOVASCULAR: regular rate and rhythm, no murmur. No lower  extremity edema   RESPIRATORY: Clear to auscultation bilaterally, no wheezes or crackles.  GI: non-tender, non-distended, normal bowel sounds.   SKIN: warm periphery, no rashes      Data   Recent Labs   Lab 08/21/21  0718 08/20/21  0625 08/19/21  1001   WBC 4.0 4.0 7.2   HGB 12.4 14.0 14.4   MCV 90 90 90    157 156    138 135   POTASSIUM 4.3 4.2 4.2   CHLORIDE 98 98 95*   CO2 31 31 32*   BUN 18 17 13   CR 0.66 0.69 0.88   ANIONGAP 6 9 8   JUDY 8.4* 8.3* 8.8   * 155* 157*   ALBUMIN  --   --  3.6   PROTTOTAL  --   --  7.1   BILITOTAL  --   --  0.6   ALKPHOS  --   --  28*   ALT  --   --  20   AST  --   --  34     Medications     lactated ringers Stopped (08/20/21 2230)     - MEDICATION INSTRUCTIONS -         remdesivir  100 mg Intravenous Q24H    And     sodium chloride 0.9%  50 mL Intravenous Q24H     acetaminophen  975 mg Oral Q8H     albuterol  2 puff Inhalation 4x Daily     buPROPion  150 mg Oral Daily     dexamethasone  6 mg Oral Daily     enoxaparin ANTICOAGULANT  50 mg Subcutaneous Q12H     famotidine  20 mg Oral BID     fluticasone-vilanterol  1 puff Inhalation Daily     ipratropium  2 puff Inhalation 4x daily     lidocaine   Transdermal Q8H     metoprolol succinate ER  100 mg Oral Daily     sodium chloride (PF)  3 mL Intracatheter Q8H     tiotropium  2 puff Inhalation Daily     traZODone  150 mg Oral At Bedtime

## 2021-08-21 NOTE — PROGRESS NOTES
Pt weaned down to 4.5 lpm and wears home O2 at 3 lpm.  Will need continued guidance with inhalers.  Seems to be improving.  Lali Bean, RT on 8/20/2021 at 7:38 PM

## 2021-08-21 NOTE — PLAN OF CARE
Problem: Adult Inpatient Plan of Care  Goal: Plan of Care Review  Outcome: Improving  Goal: Patient-Specific Goal (Individualized)  Outcome: Improving  Goal: Absence of Hospital-Acquired Illness or Injury  Outcome: Improving  Intervention: Identify and Manage Fall Risk  Recent Flowsheet Documentation  Taken 8/21/2021 0422 by Lisa Neely RN  Safety Promotion/Fall Prevention:   patient and family education   patient video monitoring   safety round/check completed  Taken 8/21/2021 0100 by Lisa Neely RN  Safety Promotion/Fall Prevention:   patient and family education   patient video monitoring   safety round/check completed   treat underlying cause  Taken 8/20/2021 1900 by Lisa Neely RN  Safety Promotion/Fall Prevention:   patient and family education   patient video monitoring   safety round/check completed   treat underlying cause  Intervention: Prevent Skin Injury  Recent Flowsheet Documentation  Taken 8/21/2021 0100 by Lisa Neely RN  Body Position: position changed independently  Taken 8/20/2021 1900 by Lisa Neely RN  Body Position: position changed independently  Intervention: Prevent and Manage VTE (Venous Thromboembolism) Risk  Recent Flowsheet Documentation  Taken 8/21/2021 0422 by Lisa Neely RN  VTE Prevention/Management: anticoagulant therapy maintained  Taken 8/21/2021 0100 by Lisa Neely RN  VTE Prevention/Management:   anticoagulant therapy maintained   ambulation promoted  Taken 8/20/2021 1900 by Lisa Neely RN  VTE Prevention/Management:   anticoagulant therapy maintained   ambulation promoted  Intervention: Prevent Infection  Recent Flowsheet Documentation  Taken 8/21/2021 0422 by Lisa Neely RN  Infection Prevention:   hand hygiene promoted   rest/sleep promoted  Taken 8/21/2021 0100 by Lisa Neely RN  Infection Prevention:   hand hygiene promoted   personal protective equipment utilized   rest/sleep promoted  Taken 8/20/2021 1900 by Lisa Neely  RN  Infection Prevention:   hand hygiene promoted   personal protective equipment utilized   rest/sleep promoted  Goal: Optimal Comfort and Wellbeing  Outcome: Improving  Goal: Readiness for Transition of Care  Outcome: Improving     Problem: Gas Exchange Impaired  Goal: Optimal Gas Exchange  Outcome: Improving  Intervention: Optimize Oxygenation and Ventilation  Recent Flowsheet Documentation  Taken 8/21/2021 0100 by Lisa Neely, RN  Head of Bed (HOB) Positioning: HOB at 15 degrees  Taken 8/20/2021 1900 by Lisa Neely, RN  Head of Bed (HOB) Positioning: HOB at 30-45 degrees

## 2021-08-21 NOTE — PROGRESS NOTES
Patient very confused.  She is disoriented to place, time, or situation.  Reoriented, patient surprised that she is the hospital with COVID but was able to eventually settle back into bed.  VSS.  Will continue to monitor.

## 2021-08-21 NOTE — PROGRESS NOTES
Report received from ELISA Herrera.  Patient is comfortably sitting in bed at this time, VSS.  She has discomfort with her IV's; requesting to have them all removed.  Explained rationale for IV's to be in place while she is in the hospital, adamant that one is removed.  Both IV's are patent with no pain with infusing, but they are uncomfortable to touch and movement.  One IV removed with the agreement that the other needs to remain in place.  She is hopeful to go home tomorrow as she has concerns about her daughter's health.  Oxygen at 4.5L, oxygen sats are 97% while resting.  No complaints of SOB or discomfort.  Will continue to monitor.

## 2021-08-22 VITALS
HEIGHT: 64 IN | OXYGEN SATURATION: 93 % | DIASTOLIC BLOOD PRESSURE: 84 MMHG | SYSTOLIC BLOOD PRESSURE: 140 MMHG | RESPIRATION RATE: 10 BRPM | TEMPERATURE: 97.2 F | HEART RATE: 56 BPM | WEIGHT: 248.5 LBS | BODY MASS INDEX: 42.43 KG/M2

## 2021-08-22 LAB
ANION GAP SERPL CALCULATED.3IONS-SCNC: 5 MMOL/L (ref 3–14)
BASE EXCESS BLDV CALC-SCNC: 9.3 MMOL/L (ref -7.7–1.9)
BUN SERPL-MCNC: 15 MG/DL (ref 7–25)
CALCIUM SERPL-MCNC: 8.7 MG/DL (ref 8.6–10.3)
CHLORIDE BLD-SCNC: 95 MMOL/L (ref 98–107)
CO2 SERPL-SCNC: 33 MMOL/L (ref 21–31)
CREAT SERPL-MCNC: 0.68 MG/DL (ref 0.6–1.2)
CRP SERPL-MCNC: 14.3 MG/L
D DIMER PPP FEU-MCNC: 1.04 UG/ML FEU (ref 0–0.5)
ERYTHROCYTE [DISTWIDTH] IN BLOOD BY AUTOMATED COUNT: 13.4 % (ref 10–15)
FIBRINOGEN PPP-MCNC: 455 MG/DL (ref 170–490)
GFR SERPL CREATININE-BSD FRML MDRD: 88 ML/MIN/1.73M2
GLUCOSE BLD-MCNC: 109 MG/DL (ref 70–105)
HCO3 BLDV-SCNC: 34 MMOL/L (ref 21–28)
HCT VFR BLD AUTO: 39.2 % (ref 35–47)
HGB BLD-MCNC: 13.2 G/DL (ref 11.7–15.7)
MCH RBC QN AUTO: 29.9 PG (ref 26.5–33)
MCHC RBC AUTO-ENTMCNC: 33.7 G/DL (ref 31.5–36.5)
MCV RBC AUTO: 89 FL (ref 78–100)
O2/TOTAL GAS SETTING VFR VENT: 4 %
PCO2 BLDV: 45 MM HG (ref 40–50)
PH BLDV: 7.48 [PH] (ref 7.32–7.43)
PLATELET # BLD AUTO: 191 10E3/UL (ref 150–450)
PO2 BLDV: 81 MM HG (ref 25–47)
POTASSIUM BLD-SCNC: 4 MMOL/L (ref 3.5–5.1)
RBC # BLD AUTO: 4.41 10E6/UL (ref 3.8–5.2)
SODIUM SERPL-SCNC: 133 MMOL/L (ref 134–144)
WBC # BLD AUTO: 4.1 10E3/UL (ref 4–11)

## 2021-08-22 PROCEDURE — 180N000001 HC R&B LOA DAYS

## 2021-08-22 PROCEDURE — 85027 COMPLETE CBC AUTOMATED: CPT | Performed by: INTERNAL MEDICINE

## 2021-08-22 PROCEDURE — 85384 FIBRINOGEN ACTIVITY: CPT | Performed by: INTERNAL MEDICINE

## 2021-08-22 PROCEDURE — 36415 COLL VENOUS BLD VENIPUNCTURE: CPT | Performed by: FAMILY MEDICINE

## 2021-08-22 PROCEDURE — 99239 HOSP IP/OBS DSCHRG MGMT >30: CPT | Performed by: FAMILY MEDICINE

## 2021-08-22 PROCEDURE — 85379 FIBRIN DEGRADATION QUANT: CPT | Performed by: INTERNAL MEDICINE

## 2021-08-22 PROCEDURE — 86140 C-REACTIVE PROTEIN: CPT | Performed by: INTERNAL MEDICINE

## 2021-08-22 PROCEDURE — 36415 COLL VENOUS BLD VENIPUNCTURE: CPT | Performed by: INTERNAL MEDICINE

## 2021-08-22 PROCEDURE — 80048 BASIC METABOLIC PNL TOTAL CA: CPT | Performed by: INTERNAL MEDICINE

## 2021-08-22 PROCEDURE — 82803 BLOOD GASES ANY COMBINATION: CPT | Performed by: FAMILY MEDICINE

## 2021-08-22 PROCEDURE — 250N000012 HC RX MED GY IP 250 OP 636 PS 637: Performed by: INTERNAL MEDICINE

## 2021-08-22 PROCEDURE — 250N000013 HC RX MED GY IP 250 OP 250 PS 637: Performed by: INTERNAL MEDICINE

## 2021-08-22 RX ORDER — DOXYCYCLINE 100 MG/1
100 CAPSULE ORAL 2 TIMES DAILY
Qty: 14 CAPSULE | Refills: 0 | Status: SHIPPED | OUTPATIENT
Start: 2021-08-22 | End: 2021-08-22

## 2021-08-22 RX ORDER — PREDNISONE 20 MG/1
TABLET ORAL
Qty: 20 TABLET | Refills: 0 | Status: ON HOLD | OUTPATIENT
Start: 2021-08-22 | End: 2021-08-27

## 2021-08-22 RX ORDER — PREDNISONE 20 MG/1
TABLET ORAL
Qty: 20 TABLET | Refills: 0 | Status: SHIPPED | OUTPATIENT
Start: 2021-08-22 | End: 2021-08-22

## 2021-08-22 RX ORDER — DOXYCYCLINE 100 MG/1
100 CAPSULE ORAL 2 TIMES DAILY
Qty: 14 CAPSULE | Refills: 0 | Status: ON HOLD | OUTPATIENT
Start: 2021-08-22 | End: 2021-08-27

## 2021-08-22 RX ADMIN — BUPROPION HYDROCHLORIDE 150 MG: 150 TABLET, FILM COATED, EXTENDED RELEASE ORAL at 09:27

## 2021-08-22 RX ADMIN — FAMOTIDINE 20 MG: 20 TABLET ORAL at 09:25

## 2021-08-22 RX ADMIN — ACETAMINOPHEN 975 MG: 325 TABLET, FILM COATED ORAL at 08:32

## 2021-08-22 RX ADMIN — DEXAMETHASONE 6 MG: 2 TABLET ORAL at 09:25

## 2021-08-22 RX ADMIN — METOPROLOL SUCCINATE 100 MG: 100 TABLET, EXTENDED RELEASE ORAL at 09:25

## 2021-08-22 ASSESSMENT — ACTIVITIES OF DAILY LIVING (ADL)
ADLS_ACUITY_SCORE: 19

## 2021-08-22 NOTE — PROGRESS NOTES
Patient intent on leaving the hospital. Her vital signs are normal but she is confused to time and situation with intermittent visual hallucinations. She's convinced that she can and needs to drive home because her daughter is sick and convulsing however, I spoke with daughter and she said she is sick but she's ok and does not need her mom's help at this time. The patient attempted to leave and a code green was called. Eventually we did talk her into staying one more night although she remained agitated. She demanded her IV be takin out, 2 mg of ativan given IV prior to removal. She is now sleeping comfortably in her bed. She agreed to Spo2 monitoring only, Dr. Camilo is aware. Will continue to monitor closely.

## 2021-08-22 NOTE — PROGRESS NOTES
Patient dressed and demanded to leave tonight. She has no portable oxygen. She is not safe to go without her home oxygen. Quite agitated and wanting to go to her car. She exhibited similar behaviors last night, but cleared today. Now symptoms returned. Differential diagnosis includes sundowning behavior vs ICU psychosis vs withdrawal symptoms. Given her relative clearing today but worsening again tonight, I suspect this is sundowning. She is not safe to go home tonight, and obviously cannot formulate a plan for safe discharge. Code green called due to patient being confrontational and leaving room without mask and being COVID positive. Patient given IM zyprexa and as needed lorazepam.     Ezra Camilo M.D. 8/21/2021  7:51 PM

## 2021-08-22 NOTE — PROGRESS NOTES
Patient still refusing care other than SpO2 monitoring and supplemental O2. She continues to be impulsive and confused but redirectable at the  moment.

## 2021-08-22 NOTE — PHARMACY - DISCHARGE MEDICATION RECONCILIATION
[facility]  Part of [address] [address]    August 22, 2021    Dear Pharmacist,    Your customer, Prisca Casillas, born on 1950, was recently discharged from [facility].  We have updated her medication list and want to alert you to the following:       Review of your medicines      START taking      Dose / Directions   doxycycline hyclate 100 MG capsule  Commonly known as: VIBRAMYCIN      Dose: 100 mg  Take 1 capsule (100 mg) by mouth 2 times daily  Quantity: 14 capsule  Refills: 0        CONTINUE these medicines which may have CHANGED, or have new prescriptions. If we are uncertain of the size of tablets/capsules you have at home, strength may be listed as something that might have changed.      Dose / Directions   predniSONE 20 MG tablet  Commonly known as: DELTASONE  This may have changed:     medication strength    how much to take    how to take this    when to take this    additional instructions      Take 3 tabs by mouth daily x 3 days, then 2 tabs daily x 3 days, then 1 tab daily x 3 days, then 1/2 tab daily x 3 days.  Quantity: 20 tablet  Refills: 0        CONTINUE these medicines which have NOT CHANGED      Dose / Directions   * albuterol (2.5 MG/3ML) 0.083% neb solution  Commonly known as: PROVENTIL      Dose: 2.5 mg  Take 2.5 mg by nebulization every 4 hours as needed for shortness of breath / dyspnea or wheezing  Refills: 0     * albuterol 108 (90 Base) MCG/ACT inhaler  Commonly known as: PROAIR HFA/PROVENTIL HFA/VENTOLIN HFA      Dose: 2 puff  Inhale 2 puffs into the lungs every 6 hours as needed  Refills: 0     buPROPion 150 MG 24 hr tablet  Commonly known as: WELLBUTRIN XL      Dose: 150 mg  Take 150 mg by mouth every morning  Refills: 0     cholecalciferol 25 mcg (1000 units) capsule  Commonly known as: VITAMIN D3      Dose: 2 capsule  Take 2 capsules by mouth daily  Refills: 0     fluticasone-salmeterol 500-50  MCG/DOSE inhaler  Commonly known as: ADVAIR      Dose: 1 puff  Inhale 1 puff into the lungs every 12 hours  Refills: 0     hydrOXYzine 25 MG tablet  Commonly known as: ATARAX      Dose: 25 mg  Take 25 mg by mouth nightly as needed  Refills: 0     lidocaine 5 % patch  Commonly known as: LIDODERM      Dose: 1 patch  Place 1 patch onto the skin daily as needed To prevent lidocaine toxicity, patient should be patch free for 12 hrs daily.  Refills: 0     metoprolol succinate  MG 24 hr tablet  Commonly known as: TOPROL-XL      Dose: 100 mg  Take 100 mg by mouth daily  Refills: 0     olopatadine 0.1 % ophthalmic solution  Commonly known as: PATANOL      Instill one drop in affected eye(s) twice daily as needed for allergies  Refills: 0     omeprazole 20 MG DR capsule  Commonly known as: priLOSEC      Dose: 20 mg  Take 20 mg by mouth daily  Refills: 0     SM Vitamin B-12 500 MCG tablet  Generic drug: vitamin B-12      Dose: 2 tablet  Take 2 tablets by mouth daily  Refills: 0     tiotropium 18 MCG inhaled capsule  Commonly known as: SPIRIVA      Dose: 18 mcg  Inhale 18 mcg into the lungs daily  Refills: 0     traZODone 100 MG tablet  Commonly known as: DESYREL      Dose: 150 mg  Take 150 mg by mouth At Bedtime  Refills: 0         * This list has 2 medication(s) that are the same as other medications prescribed for you. Read the directions carefully, and ask your doctor or other care provider to review them with you.            STOP taking    ibuprofen 800 MG tablet  Commonly known as: ADVIL/MOTRIN              Where to get your medicines      These medications were sent to [id number] - [place], [place] - [address] [facility] [facility] [address]    Phone: [phone]     doxycycline hyclate 100 MG capsule    predniSONE 20 MG tablet         We also reviewed Prisca Casillas's allergy list and updated it as needed:  Allergies: Sulfa drugs, Flu virus vaccine, and  Pneumococcal polysaccharides    Thank you for continuing to care for Prisca Casillas.  We look forward to working together with you in the future.    Sincerely,  Lynne Francisco [facility]

## 2021-08-22 NOTE — PROGRESS NOTES
[facility] DISCHARGE NOTE    Patient discharged to home at 10:45 AM via wheel chair. Accompanied by staff. Discharge instructions reviewed with patient, opportunity offered to ask questions. Prescriptions sent to patients preferred pharmacy. All belongings sent with patient.    Kirstie Hatfield RN

## 2021-08-22 NOTE — DISCHARGE SUMMARY
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 POA      History of Present Illness   Prisca Casillas is an 70 year old female who presented with acute on chronic respiratory failure due to COVID.     Hospital Course   Prisca Casillas was admitted on 8/19/2021.  The following problems were addressed during her hospitalization:    Acute on chronic respiratory failure with hypoxia (H), present on admission.  Patient normally on 3 L nasal cannula at home.  She is Covid positive here.  Additionally has comorbid condition that she describes as asthma but likely COPD.  She does not want to get vaccinated.  She was given dexamethasone and remdesivir with improvement in her symptoms.  Was on her home oxygen level at time of discharge.  Was given prophylactic Lovenox.    She developed some agitation overnight.  Was given Ativan with good relief of her symptoms.  Possible hospital delirium versus steroid side effect versus some cognitive or dementia type process that has not yet been diagnosed.  She remembers the events vividly.  Tells me today she was just frustrated and did not like being told what to do.  We will taper her steroid at home.  She will stay with her daughter.  Outpatient follow-up.    COPD and asthma, acute on chronic.  Had wheezing on exam.  Given her comorbidities with Covid I will give her a steroid taper at time of discharge.  Additionally, since she will be on higher dose steroids for several days I will also prescribe her doxycycline to cover for  possible infection.  She does not have any fevers at time of discharge.  Continue home oxygen.  Advised that if she has any worsening symptoms she needs to return to the hospital immediately.    Twyla Francois DO    Significant Results and Procedures       Pending Results   These results will be followed up by CP.  Unresulted Labs Ordered in the Past 30 Days of this Admission     Date and Time Order Name Status Description    8/19/2021  9:48 AM Blood Culture Arm, Right In process           Code Status   Full Code       Primary Care Physician   Mariel Godinez    Physical Exam   Temp: 97.2  F (36.2  C) Temp src: Temporal BP: (!) 140/84 Pulse: (!) 49   Resp: 10 SpO2: 93 % O2 Device: Nasal cannula Oxygen Delivery: 3 LPM  Vitals:    08/19/21 0949 08/20/21 0509 08/21/21 0500   Weight: 99.6 kg (219 lb 9.3 oz) 112.2 kg (247 lb 4.8 oz) 112.7 kg (248 lb 8 oz)     Vital Signs with Ranges  Temp:  [97.1  F (36.2  C)-98.1  F (36.7  C)] 97.2  F (36.2  C)  Pulse:  [49-66] 49  Resp:  [9-27] 10  BP: (134-140)/(61-84) 140/84  FiO2 (%):  [21 %] 21 %  SpO2:  [86 %-99 %] 93 %  I/O last 3 completed shifts:  In: 908 [P.O.:558; I.V.:350]  Out: 1500 [Urine:1500]    Constitutional:, Alert and oriented.  Cooperative and interactive.  Obese.  Wearing nasal cannula oxygen.  Respiratory: Diminished throughout with expiratory wheezing.  No increased work of breathing or accessory muscle use.  Cardiovascular: Regular rate.  No murmur.  No lower extremity edema.  GI: Abdomen soft, nontender, nondistended.  Skin: Warm and dry.  No concerning bruises or petechiae.  Ascending rashes.    Discharge Disposition   Discharged to home  Condition at discharge: Stable    Consultations This Hospital Stay   None    Time Spent on this Encounter   ITwyla DO, personally saw the patient today and spent greater than 30 minutes discharging this patient.    Discharge Orders      Reason for your hospital stay    COVID and COPD/asthma     Follow-up and  recommended labs and tests     Follow up with primary care provider, Mariel Godinez, within 7 days for hospital follow- up.  No follow up labs or test are needed.     Activity    Your activity upon discharge: activity as tolerated     Oxygen Adult/Peds    Oxygen Documentation:   I certify that this patient, Prisca Casillas has been under my care (or a nurse practitioner or physican's assistant working with me). This is the face-to-face encounter for oxygen medical necessity.      Prisca Casillas is now in a chronic stable state and continues to require supplemental oxygen. Patient has continued oxygen desaturation due to COPD J44.9.    Alternative treatment(s) tried or considered and deemed clinically infective for treatment of COPD J44.9 include nebulizers, inhalers, steroids, and pulmonary toileting.  If portability is ordered, is the patient mobile within the home? yes    **Patients who qualify for home O2 coverage under the CMS guidelines require ABG tests or O2 sat readings obtained closest to, but no earlier than 2 days prior to the discharge, as evidence of the need for home oxygen therapy. Testing must be performed while patient is in the chronic stable state. See notes for O2 sats.**     Diet    Follow this diet upon discharge: Orders Placed This Encounter      Combination Diet Regular Diet Adult     Discharge Medications   Current Discharge Medication List      START taking these medications    Details   doxycycline hyclate (VIBRAMYCIN) 100 MG capsule Take 1 capsule (100 mg) by mouth 2 times daily  Qty: 14 capsule, Refills: 0    Associated Diagnoses: Pneumonia due to COVID-19 virus      !! predniSONE (DELTASONE) 20 MG tablet Take 3 tabs by mouth daily x 3 days, then 2 tabs daily x 3 days, then 1 tab daily x 3 days, then 1/2 tab daily x 3 days.  Qty: 20 tablet, Refills: 0    Associated Diagnoses: Pneumonia due to COVID-19 virus       !! - Potential duplicate medications found. Please discuss with provider.       CONTINUE these medications which have NOT CHANGED    Details   albuterol (PROAIR HFA/PROVENTIL HFA/VENTOLIN HFA) 108 (90 Base) MCG/ACT Inhaler Inhale 2 puffs into the lungs every 6 hours as needed       albuterol (PROVENTIL) (2.5 MG/3ML) 0.083% neb solution Take 2.5 mg by nebulization every 4 hours as needed for shortness of breath / dyspnea or wheezing       buPROPion (WELLBUTRIN XL) 150 MG 24 hr tablet Take 150 mg by mouth every morning      cholecalciferol (VITAMIN D3) 25 mcg (1000 units) capsule Take 2 capsules by mouth daily      cyanocobalamin (SM VITAMIN B-12) 500 MCG TABS Take 2 tablets by mouth daily       fluticasone-salmeterol (ADVAIR) 500-50 MCG/DOSE inhaler Inhale 1 puff into the lungs every 12 hours      hydrOXYzine (ATARAX) 25 MG tablet Take 25 mg by mouth nightly as needed       lidocaine (LIDODERM) 5 % patch Place 1 patch onto the skin daily as needed To prevent lidocaine toxicity, patient should be patch free for 12 hrs daily.       metoprolol succinate (TOPROL-XL) 100 MG 24 hr tablet Take 100 mg by mouth daily      olopatadine (PATANOL) 0.1 % ophthalmic solution Instill one drop in affected eye(s) twice daily as needed for allergies      omeprazole (PRILOSEC) 20 MG DR capsule Take 20 mg by mouth daily      !! predniSONE (DELTASONE) 10 MG tablet Take 20 mg by mouth 2 times daily       tiotropium (SPIRIVA) 18 MCG inhaled capsule Inhale 18 mcg into the lungs daily      traZODone (DESYREL) 100 MG tablet Take 150 mg by mouth At Bedtime        !! - Potential duplicate medications found. Please discuss with provider.      STOP taking these medications       ibuprofen (ADVIL/MOTRIN) 800 MG tablet Comments:   Reason for Stopping:             Allergies   Allergies   Allergen Reactions     Sulfa Drugs      Other reaction(s): Seizures  Was reported to her by family as a child     Flu Virus Vaccine Swelling     Pneumococcal Polysaccharides Swelling     Data   Most Recent 3 CBC's:  Recent Labs   Lab Test  08/22/21  0828 08/21/21  0718 08/20/21  0625   WBC 4.1 4.0 4.0   HGB 13.2 12.4 14.0   MCV 89 90 90    165 157      Most Recent 3 BMP's:  Recent Labs   Lab Test 08/22/21  0828 08/21/21  0718 08/20/21  0625   * 135 138   POTASSIUM 4.0 4.3 4.2   CHLORIDE 95* 98 98   CO2 33* 31 31   BUN 15 18 17   CR 0.68 0.66 0.69   ANIONGAP 5 6 9   JUDY 8.7 8.4* 8.3*   * 141* 155*     Most Recent 2 LFT's:  Recent Labs   Lab Test 08/19/21  1001   AST 34   ALT 20   ALKPHOS 28*   BILITOTAL 0.6     Most Recent INR's and Anticoagulation Dosing History:  Anticoagulation Dose History    There is no flowsheet data to display.       Most Recent 3 Troponin's:No lab results found.  Most Recent Cholesterol Panel:No lab results found.  Most Recent 6 Bacteria Isolates From Any Culture (See EPIC Reports for Culture Details):No lab results found.  Most Recent TSH, T4 and A1c Labs:No lab results found.  Results for orders placed or performed during the hospital encounter of 08/19/21   XR Chest Port 1 View    Narrative    Exam:  XR CHEST PORT 1 VIEW    HISTORY: Dyspnea/SOB.    COMPARISON:  2/11/2014    FINDINGS:     The cardiomediastinal contours are stable.     There are multifocal patchy opacities in the mid to lower lungs  bilaterally. No pleural effusion or pneumothorax.      No acute osseous abnormality. No subdiaphragmatic free air.      Impression    IMPRESSION:      Multifocal patchy opacities in the mid to lower lungs bilaterally,  which may be due to aspiration or pneumonia.    LORENZO TAVERA MD         SYSTEM ID:  [id number]

## 2021-08-22 NOTE — PHARMACY - DISCHARGE MEDICATION RECONCILIATION AND EDUCATION
Pharmacy:  Discharge Counseling and Medication Reconciliation    Prisca Casillas  [address]  [facility] [address]  [phone] (home)   70 year old female  PCP: Mariel Godinez    Allergies: Sulfa drugs, Flu virus vaccine, and Pneumococcal polysaccharides    Discharge Counseling:    Pharmacist met with patient (and/or family) today to review the medication portion of the After Visit Summary (with an emphasis on NEW medications) and to address patient's questions/concerns.    Summary of Education: Spoke with patient over the phone to discuss new medication doxycycline and prednisone taper.  Prescriptions were sent to [facility] due to [facility] is closed on weekends.    Materials Provided:  MedCounselor sheets printed from Clinical Pharmacology on: declined    Discharge Medication Reconciliation:    It has been determined that the patient has an adequate supply of medications available or which can be obtained from the patient's preferred pharmacy, which HE/SHE has confirmed as: [facility] - closed on weekends - updated discharge med list sent to pharmacy.    Thank you for the consult.    Lynne Francisco RPH........August 22, 2021 10:10 AM

## 2021-08-22 NOTE — PLAN OF CARE
Problem: Adult Inpatient Plan of Care  Goal: Plan of Care Review  Outcome: Adequate for Discharge  Goal: Patient-Specific Goal (Individualized)  Outcome: Adequate for Discharge  Goal: Absence of Hospital-Acquired Illness or Injury  Outcome: Adequate for Discharge  Intervention: Identify and Manage Fall Risk  Recent Flowsheet Documentation  Taken 8/22/2021 0827 by Kirstie Hatfield RN  Safety Promotion/Fall Prevention:   activity supervised   nonskid shoes/slippers when out of bed   assistive device/personal items within reach   room organization consistent   sitter at bedside  Intervention: Prevent Skin Injury  Recent Flowsheet Documentation  Taken 8/22/2021 0827 by Kirstie Hatfield RN  Body Position: position changed independently  Intervention: Prevent and Manage VTE (Venous Thromboembolism) Risk  Recent Flowsheet Documentation  Taken 8/22/2021 0827 by Kirstie Hatfield RN  VTE Prevention/Management: patient refused interventions(s)  Intervention: Prevent Infection  Recent Flowsheet Documentation  Taken 8/22/2021 0827 by Kirstie Hatfield RN  Infection Prevention:   hand hygiene promoted   personal protective equipment utilized   single patient room provided   visitors restricted/screened   environmental surveillance performed  Goal: Optimal Comfort and Wellbeing  Outcome: Adequate for Discharge  Goal: Readiness for Transition of Care  Outcome: Adequate for Discharge     Problem: Gas Exchange Impaired  Goal: Optimal Gas Exchange  Outcome: Adequate for Discharge

## 2021-08-23 ENCOUNTER — HOSPITAL ENCOUNTER (INPATIENT)
Facility: OTHER | Age: 71
LOS: 4 days | Discharge: HOME | End: 2021-08-27
Attending: FAMILY MEDICINE | Admitting: FAMILY MEDICINE
Payer: COMMERCIAL

## 2021-08-23 ENCOUNTER — PATIENT OUTREACH (OUTPATIENT)
Dept: CARE COORDINATION | Facility: CLINIC | Age: 71
End: 2021-08-23

## 2021-08-23 DIAGNOSIS — U07.1 PNEUMONIA DUE TO COVID-19 VIRUS: ICD-10-CM

## 2021-08-23 DIAGNOSIS — J12.82 PNEUMONIA DUE TO COVID-19 VIRUS: ICD-10-CM

## 2021-08-23 PROBLEM — J96.21 ACUTE ON CHRONIC RESPIRATORY FAILURE WITH HYPOXIA (H): Status: ACTIVE | Noted: 2021-08-19

## 2021-08-23 LAB
APTT PPP: 32 SECONDS (ref 22–38)
CRP SERPL-MCNC: 14.5 MG/L
D DIMER PPP FEU-MCNC: 1.35 UG/ML FEU (ref 0–0.5)
FIBRINOGEN PPP-MCNC: 469 MG/DL (ref 170–490)
HOLD SPECIMEN: NORMAL
INR PPP: 0.92 (ref 0.85–1.15)
LDH SERPL L TO P-CCNC: 198 U/L (ref 140–271)
TROPONIN I SERPL-MCNC: 16.6 PG/ML (ref 0–34)

## 2021-08-23 PROCEDURE — XW033E5 INTRODUCTION OF REMDESIVIR ANTI-INFECTIVE INTO PERIPHERAL VEIN, PERCUTANEOUS APPROACH, NEW TECHNOLOGY GROUP 5: ICD-10-PCS | Performed by: FAMILY MEDICINE

## 2021-08-23 PROCEDURE — 250N000009 HC RX 250: Performed by: FAMILY MEDICINE

## 2021-08-23 PROCEDURE — 84484 ASSAY OF TROPONIN QUANT: CPT | Performed by: FAMILY MEDICINE

## 2021-08-23 PROCEDURE — 85379 FIBRIN DEGRADATION QUANT: CPT | Performed by: FAMILY MEDICINE

## 2021-08-23 PROCEDURE — 85610 PROTHROMBIN TIME: CPT | Performed by: FAMILY MEDICINE

## 2021-08-23 PROCEDURE — 258N000003 HC RX IP 258 OP 636: Performed by: FAMILY MEDICINE

## 2021-08-23 PROCEDURE — 999N000157 HC STATISTIC RCP TIME EA 10 MIN

## 2021-08-23 PROCEDURE — 99222 1ST HOSP IP/OBS MODERATE 55: CPT | Performed by: FAMILY MEDICINE

## 2021-08-23 PROCEDURE — 85384 FIBRINOGEN ACTIVITY: CPT | Performed by: FAMILY MEDICINE

## 2021-08-23 PROCEDURE — 83615 LACTATE (LD) (LDH) ENZYME: CPT | Performed by: FAMILY MEDICINE

## 2021-08-23 PROCEDURE — 86140 C-REACTIVE PROTEIN: CPT | Performed by: FAMILY MEDICINE

## 2021-08-23 PROCEDURE — 250N000011 HC RX IP 250 OP 636: Performed by: FAMILY MEDICINE

## 2021-08-23 PROCEDURE — 36415 COLL VENOUS BLD VENIPUNCTURE: CPT | Performed by: FAMILY MEDICINE

## 2021-08-23 PROCEDURE — 85730 THROMBOPLASTIN TIME PARTIAL: CPT | Performed by: FAMILY MEDICINE

## 2021-08-23 PROCEDURE — 120N000001 HC R&B MED SURG/OB

## 2021-08-23 RX ORDER — LIDOCAINE 40 MG/G
CREAM TOPICAL
Status: DISCONTINUED | OUTPATIENT
Start: 2021-08-23 | End: 2021-08-27 | Stop reason: HOSPADM

## 2021-08-23 RX ORDER — TRAZODONE HYDROCHLORIDE 50 MG/1
150 TABLET, FILM COATED ORAL AT BEDTIME
Status: DISCONTINUED | OUTPATIENT
Start: 2021-08-23 | End: 2021-08-27 | Stop reason: HOSPADM

## 2021-08-23 RX ORDER — ONDANSETRON 4 MG/1
4 TABLET, ORALLY DISINTEGRATING ORAL EVERY 6 HOURS PRN
Status: DISCONTINUED | OUTPATIENT
Start: 2021-08-23 | End: 2021-08-27 | Stop reason: HOSPADM

## 2021-08-23 RX ORDER — ACETAMINOPHEN 650 MG/1
650 SUPPOSITORY RECTAL EVERY 6 HOURS PRN
Status: DISCONTINUED | OUTPATIENT
Start: 2021-08-23 | End: 2021-08-27 | Stop reason: HOSPADM

## 2021-08-23 RX ORDER — ACETAMINOPHEN 325 MG/1
650 TABLET ORAL EVERY 6 HOURS PRN
Status: DISCONTINUED | OUTPATIENT
Start: 2021-08-23 | End: 2021-08-27 | Stop reason: HOSPADM

## 2021-08-23 RX ORDER — LORAZEPAM 2 MG/ML
.5-1 INJECTION INTRAMUSCULAR EVERY 4 HOURS PRN
Status: DISCONTINUED | OUTPATIENT
Start: 2021-08-23 | End: 2021-08-27 | Stop reason: HOSPADM

## 2021-08-23 RX ORDER — PANTOPRAZOLE SODIUM 40 MG/1
40 TABLET, DELAYED RELEASE ORAL
Status: DISCONTINUED | OUTPATIENT
Start: 2021-08-24 | End: 2021-08-27 | Stop reason: HOSPADM

## 2021-08-23 RX ORDER — AMOXICILLIN 250 MG
1 CAPSULE ORAL 2 TIMES DAILY PRN
Status: DISCONTINUED | OUTPATIENT
Start: 2021-08-23 | End: 2021-08-27 | Stop reason: HOSPADM

## 2021-08-23 RX ORDER — AMOXICILLIN 250 MG
2 CAPSULE ORAL 2 TIMES DAILY PRN
Status: DISCONTINUED | OUTPATIENT
Start: 2021-08-23 | End: 2021-08-27 | Stop reason: HOSPADM

## 2021-08-23 RX ORDER — ALBUTEROL SULFATE 90 UG/1
2 AEROSOL, METERED RESPIRATORY (INHALATION) EVERY 4 HOURS PRN
Status: DISCONTINUED | OUTPATIENT
Start: 2021-08-23 | End: 2021-08-27 | Stop reason: HOSPADM

## 2021-08-23 RX ORDER — ONDANSETRON 2 MG/ML
4 INJECTION INTRAMUSCULAR; INTRAVENOUS EVERY 6 HOURS PRN
Status: DISCONTINUED | OUTPATIENT
Start: 2021-08-23 | End: 2021-08-27 | Stop reason: HOSPADM

## 2021-08-23 RX ORDER — METOPROLOL SUCCINATE 100 MG/1
100 TABLET, EXTENDED RELEASE ORAL DAILY
Status: DISCONTINUED | OUTPATIENT
Start: 2021-08-24 | End: 2021-08-27 | Stop reason: HOSPADM

## 2021-08-23 RX ADMIN — SODIUM CHLORIDE 50 ML: 9 INJECTION, SOLUTION INTRAVENOUS at 17:03

## 2021-08-23 RX ADMIN — REMDESIVIR 100 MG: 100 INJECTION, POWDER, LYOPHILIZED, FOR SOLUTION INTRAVENOUS at 17:03

## 2021-08-23 RX ADMIN — ENOXAPARIN SODIUM 40 MG: 100 INJECTION SUBCUTANEOUS at 21:53

## 2021-08-23 ASSESSMENT — ACTIVITIES OF DAILY LIVING (ADL)
WHICH_OF_THE_ABOVE_FUNCTIONAL_RISKS_HAD_A_RECENT_ONSET_OR_CHANGE?: BATHING
DRESSING/BATHING: BATHING DIFFICULTY, REQUIRES EQUIPMENT
WALKING_OR_CLIMBING_STAIRS_DIFFICULTY: YES
WEAR_GLASSES_OR_BLIND: YES
VISION_MANAGEMENT: READING
DRESSING/BATHING_DIFFICULTY: YES
TOILETING_ISSUES: NO
PATIENT_/_FAMILY_COMMUNICATION_STYLE: SPOKEN LANGUAGE (ENGLISH OR BILINGUAL)
ADLS_ACUITY_SCORE: 17
CONCENTRATING,_REMEMBERING_OR_MAKING_DECISIONS_DIFFICULTY: YES
HEARING_DIFFICULTY_OR_DEAF: NO
WALKING_OR_CLIMBING_STAIRS: STAIR CLIMBING DIFFICULTY, REQUIRES EQUIPMENT
FALL_HISTORY_WITHIN_LAST_SIX_MONTHS: NO
DIFFICULTY_COMMUNICATING: NO
DOING_ERRANDS_INDEPENDENTLY_DIFFICULTY: NO
DIFFICULTY_EATING/SWALLOWING: NO
ADLS_ACUITY_SCORE: 18

## 2021-08-23 ASSESSMENT — MIFFLIN-ST. JEOR: SCORE: 1615.85

## 2021-08-23 NOTE — PROGRESS NOTES
Admission Note    Data:  Prisca Casillas admitted to 358 from [facility] at 1420.      Action:  Dr. Westbrook has been notified of admission. Pt oriented to unit, call light in reach.     Response:  Patient tolerated transfer.

## 2021-08-23 NOTE — ASSESSMENT & PLAN NOTE
On chronic home oxygen at 3 L due to COPD/asthma  Hypoxemic in the [place] emergency department  Supplemental oxygen as above  At discharge back to baseline oxygen needs

## 2021-08-23 NOTE — PROGRESS NOTES
Transitional Care Management Phone Call    Clinic Care Coordination Contact  [facility]/Voicemail    Per chart and AVS, patient is to follow up with PCP Mariel Godinez at [facility]. Does not appear this was scheduled. Attempted to call and check on her, see if she needs help getting in. [facility].    Patient readmitted on 8-. Will close encounter.      Clinical Data: Care Coordinator Outreach  Outreach attempted x 2.    Mailbox is full    Plan: Care Coordinator will attempt again.   Carol Wilkins RN on 8/23/2021 at 12:03 PM

## 2021-08-23 NOTE — ASSESSMENT & PLAN NOTE
Presenting with increased cough, shortness of breath  Discharged yesterday from [facility] with similar symptoms  Chronic oxygen use at 3 L at home due to COPD/asthma  Hypoxemic less than 90% at 3 L in [place] ER  X-ray imaging consistent with Covid-19  Admit for ongoing treatment for Covid with dexamethasone, remdesivir, Lovenox  Supplement oxygen to maintain sats over 92%  8/24/2021-Stable. Needing four LPM, inflammatory numbers up slightly, continue day 2 dex, day 2 remdisavir  8/25/2021-More oxygen needs overnite at 7-8 LPM. Inflammatory numbers up again. Has finished a total of 5 days of Remdisavir, will stop, continue decadron. Continue current treatment  8/26/2021-Oxygen needs better, at 6 liters. Continue decadron, feeling better, likely home in 1-2 days  8/27/2021-better today, on 2 liters, feeling better asking to go home. Will be discharged home, will discharge with tapering dose of steroids, blood thinners will be stopped. Recommend continued rest, discussed quarantine for another week

## 2021-08-23 NOTE — PHARMACY-CONSULT NOTE
Pharmacy- Weight Dose Adjustment    Patient Active Problem List   Diagnosis     Dysphagia     Tobacco abuse     Pneumonia due to COVID-19 virus     2019 novel coronavirus disease (COVID-19)     Acute on chronic respiratory failure with hypoxia (H)     Asthma     Essential hypertension     COPD with asthma (H)        Relevant Labs:  Recent Labs   Lab Test 08/22/21  0828 08/21/21  0718   WBC 4.1 4.0   HGB 13.2 12.4    165        CrCl: 93.7 mL/min      Intake/Output Summary (Last 24 hours) at 8/23/2021 1737  Last data filed at 8/23/2021 1701  Gross per 24 hour   Intake 10 ml   Output --   Net 10 ml          Per Weight Dose Adjustment Protocol, will adjust:  Lovenox to 40 mg BID for BMI > 40.      Will continue to follow and make adjustments accordingly. Thank You.    Sonia Bravo [facility] ....................  8/23/2021   5:37 PM

## 2021-08-23 NOTE — PHARMACY-ADMISSION MEDICATION HISTORY
"Pharmacy -- Admission Medication Reconciliation    Prior to admission (PTA) medications were reviewed and the patient's PTA medication list was updated.    Sources Consulted: patient phone interview, [facility] pharmacy staff at [facility] phone call, sure scripts, discharge note 8/22/21    The reliability of this Medication Reconciliation is: Reliability: Borderline reliable    The following significant changes were made:    No changes made--patient was discharged from [facility] yesterday     **patient states no medications were taken after discharge yesterday or today    **per [facility], patient did not  doxycycline or prednisone from pharmacy yesterday.  Patient states \"I was too sick to pick it up\". Confirmed meds not picked     **patient does not know medications very well, did not know medications during last medication review    In addition, the patient's allergies were reviewed with the patient and updated as follows:   Allergies: Sulfa drugs, Flu virus vaccine, and Pneumococcal polysaccharides    The pharmacist has reviewed with the patient that all personal medications should be removed from the building or locked in the belongings safe.  Patient shall only take medications ordered by the physician and administered by the nursing staff.       Medication barriers identified: see previous note   Medication adherence concerns: see previous note   Understanding of emergency medications: see previous note    Sonia Bravo RPH, 8/23/2021,  2:44 PM     "

## 2021-08-23 NOTE — H&P
[facility]    Hospitalist History and Physical       Date of Admission:  8/23/2021  Assessment & Plan   Prisca Casillas is a 70 year old female with chronic, mild pulmonary disease who was admitted to [facility] on 8/23/2021 for evaluation of  cough, dyspnea and exertional dyspnea and found to be COVID-19 positive.    Pneumonia due to COVID-19 virus  Presenting with increased cough, shortness of breath  Discharged yesterday from [facility] with similar symptoms  Chronic oxygen use at 3 L at home due to COPD/asthma  Hypoxemic less than 90% at 3 L in [place] ER  X-ray imaging consistent with Covid-19  Admit for ongoing treatment for Covid with dexamethasone, remdesivir, Lovenox  Supplement oxygen to maintain sats over 92%    Acute on chronic respiratory failure with hypoxia (H)  On chronic home oxygen at 3 L due to COPD/asthma  Hypoxemic in the [place] emergency department  Supplemental oxygen as above    Essential hypertension  Taking Toprol at home  Continue home dosing, monitor blood pressure    COPD with asthma (H)  Chronic issues, using inhalers at home  Continue albuterol, dexamethasone ordered, monitor      COVID-19 Diagnosis Details:  Onset of COVID symptoms 8/12/2021   Date of positive test results 8/17/2021   Research study No     # Confirmed COVID-19 infection    # Acute Hypoxic Respiratory Failure secondary to COVID-19 infection  - Initial chest CT with contrast showed multifocal airspace disease. Oxygen needs have been slightly worsened.   - Admit to medical floor with continuous pulse oximetry, COVID-19 special precautions, minimized lab draws, and care consolidation  - Oxygen: continue current support with nasal cannula at 4 L/min; titrate to keep SpO2 between 90-96%  - Labs: CBC with differential, CMP, PT/INR, aPTT, fibrinogen, D-dimer, CPK, troponin and CRP done on admission and reviewed by me. Will trend daily until patient reaches clinical  stability.  - Imaging: no additional imaging needed at this time  - Breathing treatments: albuterol inhaler four times a day scheduled and PRN; avoid nebulizers in favor of MDIs   - IV fluids: not indicated at this time  - Antibiotics: not indicated   - Treatments: Dexamethasone 6 mg x 10 days or hospital discharge, started on 8/23/2021 and Remdesivir x 5 days or hospital discharge, started on 8/23/2021  - Research study protocol: No  - DVT Prophylaxis: At high risk of thrombotic complications due to COVID-19 disease (last DDimer displayed below).          - PROPHYLACTIC dosing: lovenox 40mg daily  - Discharge Anticoagulation: At discharge consider one of the following for 30 days and until the patient has returned to normal mobility:        - Apixaban (Eliquis) 2.5 mg two times a day        - Rivaroxaban (Xarelto) 10 mg once daily    D-Dimer Quantitative   Date Value Ref Range Status   08/22/2021 1.04 (H) 0.00 - 0.50 ug/mL FEU Final       ACTIVE HOSPITAL PROBLEMS:  Active Problems:    Pneumonia due to COVID-19 virus    Acute on chronic respiratory failure with hypoxia (H)    Essential hypertension    COPD with asthma (H)        Chronic, stable Medical Problems:       Clinically Significant Risk Factors Present on Admission                   Code Status: Full Code    Goals of Care/ACP:      Diet: Regular Diet Adult    Tirado Catheter: Not present  Central Lines: None  Bedside iPad: NOT used due to no device in the room    Disposition Plan   Expected discharge: 2 - 3 days, recommended to prior living arrangement once oxygen needs stabilized and home oxygen arranged and mental status at baseline.       The patient's care was discussed with the Patient.    Mynor Westbrook MD  [facility]  Securely message with the Vocera Web Console (learn more here)  Text page via [facility] Paging/Directory    ______________________________________________________________________    Chief Complaint   70-year-old  female presenting with increased shortness of breath to [place] emergency room    History is obtained from the patient, electronic health record and emergency department physician    History of Present Illness   Prisca Casillas is a 70 year old female who presents as a transfer from [place] emergency room.  She had been discharged yesterday from this hospital after being in the hospital for 2 days for Covid-19 pneumonia.  The day before discharge she become somewhat confused and was requesting AMA discharge which was postponed and she was discharged yesterday.  She was sent home with prescription for steroids and doxycycline which she did not fill.  In the ER today she was complaining of feeling short of air but is confused as to what day it is and that she had been in the hospital just as recently as yesterday.  She lives home alone.  Has history of COPD/asthma using inhalers and is chronically on 3 L of oxygen at home.  She has had a cough, denies fever complains of body aches, no change in taste or smell.  She states she had diarrhea earlier but that is passed.    Review of Systems    The 10 point Review of Systems is negative other than noted in the HPI or here.     Past Medical History    I have reviewed this patient's medical history and updated it with pertinent information if needed.   Past Medical History:   Diagnosis Date     Anxiety state     No Comments Provided     Constipation     No Comments Provided     Diaphragmatic hernia     No Comments Provided     Diverticulosis of colon     No Comments Provided     History of colonic polyps     No Comments Provided     Insomnia     No Comments Provided     Meniere's disease     No Comments Provided     Pain in thoracic spine     No Comments Provided     Personal history of malignant neoplasm of cervix uteri     Age 31, vaginal hysterectomy       Past Surgical History   I have reviewed this patient's surgical history and updated it with pertinent  information if needed.  Past Surgical History:   Procedure Laterality Date     ATTEMPTED ARTHROSCOPY      knee     COLONOSCOPY      2011     ESOPHAGOSCOPY, GASTROSCOPY, DUODENOSCOPY (EGD), COMBINED      1/23/2014     EXCISE CYST GENERIC (LOCATION)      arm     EXTRACAPSULAR CATARACT EXTRATION WITH INTRAOCULAR LENS IMPLANT      2013     HYSTERECTOMY VAGINAL      at age 31     OTHER SURGICAL HISTORY      [id number],OTHER,in her 40's       Social History   I have reviewed this patient's social history and updated it with pertinent information if needed.  Social History     Tobacco Use     Smoking status: Current Every Day Smoker     Packs/day: 0.50     Years: 52.00     Pack years: 26.00     Types: Cigarettes     Start date: 1/1/1961     Smokeless tobacco: Never Used   Substance Use Topics     Alcohol use: No     Drug use: Unknown     Types: Other     Comment: Drug use: No       Family History   I have reviewed this patient's family history and updated it with pertinent information if needed.   Family History   Problem Relation Age of Onset     Cancer Mother         Cancer     Breast Cancer Mother         Cancer-breast     Breast Cancer Maternal Aunt         Cancer-breast       Prior to Admission Medications   Prior to Admission Medications   Prescriptions Last Dose Informant Patient Reported? Taking?   albuterol (PROAIR HFA/PROVENTIL HFA/VENTOLIN HFA) 108 (90 Base) MCG/ACT Inhaler Past Week at Unknown time Self Yes Yes   Sig: Inhale 2 puffs into the lungs every 6 hours as needed    albuterol (PROVENTIL) (2.5 MG/3ML) 0.083% neb solution Past Week at Unknown time Self Yes Yes   Sig: Take 2.5 mg by nebulization every 4 hours as needed for shortness of breath / dyspnea or wheezing    buPROPion (WELLBUTRIN XL) 150 MG 24 hr tablet Past Week at Unknown time Self Yes Yes   Sig: Take 150 mg by mouth every morning   cholecalciferol (VITAMIN D3) 25 mcg (1000 units) capsule Past Week at Unknown time Self Yes Yes   Sig: Take 2 capsules  by mouth daily   cyanocobalamin (SM VITAMIN B-12) 500 MCG TABS Past Week at Unknown time Self Yes Yes   Sig: Take 2 tablets by mouth daily    doxycycline hyclate (VIBRAMYCIN) 100 MG capsule not picked up at not picked up Self No Yes   Sig: Take 1 capsule (100 mg) by mouth 2 times daily   fluticasone-salmeterol (ADVAIR) 500-50 MCG/DOSE inhaler Past Week at Unknown time Self Yes Yes   Sig: Inhale 1 puff into the lungs every 12 hours   hydrOXYzine (ATARAX) 25 MG tablet Past Week at Unknown time Self Yes Yes   Sig: Take 25 mg by mouth nightly as needed    lidocaine (LIDODERM) 5 % patch Past Week at Unknown time Self Yes Yes   Sig: Place 1 patch onto the skin daily as needed To prevent lidocaine toxicity, patient should be patch free for 12 hrs daily.    metoprolol succinate (TOPROL-XL) 100 MG 24 hr tablet Past Week at Unknown time Self Yes Yes   Sig: Take 100 mg by mouth daily   olopatadine (PATANOL) 0.1 % ophthalmic solution Past Week at Unknown time Self Yes Yes   Sig: Instill one drop in affected eye(s) twice daily as needed for allergies   omeprazole (PRILOSEC) 20 MG DR capsule Past Week at Unknown time Self Yes Yes   Sig: Take 20 mg by mouth daily   predniSONE (DELTASONE) 20 MG tablet not picked up at not picked up Self No Yes   Sig: Take 3 tabs by mouth daily x 3 days, then 2 tabs daily x 3 days, then 1 tab daily x 3 days, then 1/2 tab daily x 3 days.   tiotropium (SPIRIVA) 18 MCG inhaled capsule Past Week at Unknown time Self Yes Yes   Sig: Inhale 18 mcg into the lungs daily   traZODone (DESYREL) 100 MG tablet Past Week at Unknown time Self Yes Yes   Sig: Take 150 mg by mouth At Bedtime       Facility-Administered Medications: None     Allergies   Allergies   Allergen Reactions     Sulfa Drugs      Other reaction(s): Seizures  Was reported to her by family as a child     Flu Virus Vaccine Swelling     Pneumococcal Polysaccharides Swelling       Physical Exam   Vital Signs: Temp: 98.1  F (36.7  C) Temp src: Oral  BP: 119/74 Pulse: 65   Resp: 20 SpO2: 90 % O2 Device: Nasal cannula Oxygen Delivery: 3 LPM  Weight: 242 lbs 8 oz    General Appearance: Elderly woman, somewhat older than stated age, lying on the bed eating in no apparent distress, nasal cannula oxygen in place  Eyes: Pupils are equal, reactive  HEENT: Nose mouth and throat are normal  Respiratory: Crackles in both lung fields, no wheezing  Cardiovascular: Regular rate and rhythm, no murmur heard  GI: Abdomen is soft, nontender  Lymph/Hematologic: Cervical nodes negative  Genitourinary: Not examined  Skin: No lesions or rashes  Musculoskeletal: Moving arms and legs equally, no deformity, no loss of strength  Neurologic: No focal deficits   Psychiatric: Somewhat confused as to what happened yesterday    Data   Data reviewed today: I reviewed all medications, new labs and imaging results over the last 24 hours. I personally reviewed no images or EKG's today.    No results found for any visits on 08/23/21.

## 2021-08-24 LAB
ANION GAP SERPL CALCULATED.3IONS-SCNC: 10 MMOL/L (ref 3–14)
BUN SERPL-MCNC: 14 MG/DL (ref 7–25)
CALCIUM SERPL-MCNC: 8.8 MG/DL (ref 8.6–10.3)
CHLORIDE BLD-SCNC: 95 MMOL/L (ref 98–107)
CO2 SERPL-SCNC: 29 MMOL/L (ref 21–31)
CREAT SERPL-MCNC: 0.72 MG/DL (ref 0.6–1.2)
CRP SERPL-MCNC: 37 MG/L
D DIMER PPP FEU-MCNC: 1.19 UG/ML FEU (ref 0–0.5)
ERYTHROCYTE [DISTWIDTH] IN BLOOD BY AUTOMATED COUNT: 13.1 % (ref 10–15)
FIBRINOGEN PPP-MCNC: 518 MG/DL (ref 170–490)
GFR SERPL CREATININE-BSD FRML MDRD: 85 ML/MIN/1.73M2
GLUCOSE BLD-MCNC: 111 MG/DL (ref 70–105)
HCT VFR BLD AUTO: 43.2 % (ref 35–47)
HGB BLD-MCNC: 14.9 G/DL (ref 11.7–15.7)
MCH RBC QN AUTO: 29.8 PG (ref 26.5–33)
MCHC RBC AUTO-ENTMCNC: 34.5 G/DL (ref 31.5–36.5)
MCV RBC AUTO: 86 FL (ref 78–100)
PLATELET # BLD AUTO: 293 10E3/UL (ref 150–450)
POTASSIUM BLD-SCNC: 3.7 MMOL/L (ref 3.5–5.1)
RBC # BLD AUTO: 5 10E6/UL (ref 3.8–5.2)
SODIUM SERPL-SCNC: 134 MMOL/L (ref 134–144)
WBC # BLD AUTO: 9.5 10E3/UL (ref 4–11)

## 2021-08-24 PROCEDURE — 120N000001 HC R&B MED SURG/OB

## 2021-08-24 PROCEDURE — 250N000009 HC RX 250: Performed by: FAMILY MEDICINE

## 2021-08-24 PROCEDURE — 999N000157 HC STATISTIC RCP TIME EA 10 MIN

## 2021-08-24 PROCEDURE — 86140 C-REACTIVE PROTEIN: CPT | Performed by: FAMILY MEDICINE

## 2021-08-24 PROCEDURE — 250N000011 HC RX IP 250 OP 636: Performed by: FAMILY MEDICINE

## 2021-08-24 PROCEDURE — 99232 SBSQ HOSP IP/OBS MODERATE 35: CPT | Performed by: FAMILY MEDICINE

## 2021-08-24 PROCEDURE — 85384 FIBRINOGEN ACTIVITY: CPT | Performed by: FAMILY MEDICINE

## 2021-08-24 PROCEDURE — 36415 COLL VENOUS BLD VENIPUNCTURE: CPT | Performed by: FAMILY MEDICINE

## 2021-08-24 PROCEDURE — 80048 BASIC METABOLIC PNL TOTAL CA: CPT | Performed by: FAMILY MEDICINE

## 2021-08-24 PROCEDURE — 258N000003 HC RX IP 258 OP 636: Performed by: FAMILY MEDICINE

## 2021-08-24 PROCEDURE — 85379 FIBRIN DEGRADATION QUANT: CPT | Performed by: FAMILY MEDICINE

## 2021-08-24 PROCEDURE — 85027 COMPLETE CBC AUTOMATED: CPT | Performed by: FAMILY MEDICINE

## 2021-08-24 PROCEDURE — 250N000013 HC RX MED GY IP 250 OP 250 PS 637: Performed by: FAMILY MEDICINE

## 2021-08-24 PROCEDURE — 94640 AIRWAY INHALATION TREATMENT: CPT

## 2021-08-24 PROCEDURE — 250N000012 HC RX MED GY IP 250 OP 636 PS 637: Performed by: FAMILY MEDICINE

## 2021-08-24 RX ORDER — BENZONATATE 100 MG/1
200 CAPSULE ORAL 3 TIMES DAILY PRN
Status: DISCONTINUED | OUTPATIENT
Start: 2021-08-24 | End: 2021-08-27 | Stop reason: HOSPADM

## 2021-08-24 RX ADMIN — ACETAMINOPHEN 650 MG: 325 TABLET, FILM COATED ORAL at 08:02

## 2021-08-24 RX ADMIN — METOPROLOL SUCCINATE 100 MG: 100 TABLET, EXTENDED RELEASE ORAL at 09:58

## 2021-08-24 RX ADMIN — DEXAMETHASONE 6 MG: 2 TABLET ORAL at 14:03

## 2021-08-24 RX ADMIN — ONDANSETRON 4 MG: 2 INJECTION INTRAMUSCULAR; INTRAVENOUS at 14:02

## 2021-08-24 RX ADMIN — ENOXAPARIN SODIUM 40 MG: 100 INJECTION SUBCUTANEOUS at 21:04

## 2021-08-24 RX ADMIN — SODIUM CHLORIDE 50 ML: 9 INJECTION, SOLUTION INTRAVENOUS at 16:52

## 2021-08-24 RX ADMIN — PANTOPRAZOLE SODIUM 40 MG: 40 TABLET, DELAYED RELEASE ORAL at 07:59

## 2021-08-24 RX ADMIN — BENZONATATE 200 MG: 100 CAPSULE ORAL at 14:03

## 2021-08-24 RX ADMIN — ENOXAPARIN SODIUM 40 MG: 100 INJECTION SUBCUTANEOUS at 09:58

## 2021-08-24 RX ADMIN — TRAZODONE HYDROCHLORIDE 150 MG: 50 TABLET ORAL at 21:04

## 2021-08-24 RX ADMIN — REMDESIVIR 100 MG: 100 INJECTION, POWDER, LYOPHILIZED, FOR SOLUTION INTRAVENOUS at 16:46

## 2021-08-24 RX ADMIN — ALBUTEROL SULFATE 2 PUFF: 90 AEROSOL, METERED RESPIRATORY (INHALATION) at 15:03

## 2021-08-24 ASSESSMENT — ACTIVITIES OF DAILY LIVING (ADL)
ADLS_ACUITY_SCORE: 18

## 2021-08-24 ASSESSMENT — MIFFLIN-ST. JEOR: SCORE: 1585.92

## 2021-08-24 NOTE — PROGRESS NOTES
SAFETY CHECKLIST  ID Bands and Risk clasps correct and in place (DNR, Fall risk, Allergy, Latex, Limb):  Yes  All Lines Reconciled and labeled correctly: Yes  Whiteboard updated:Yes  Environmental interventions (bed/chair alarm on, call light, side rails, restraints, sitter....): Yes  Verify Tele #: N/A    Beulah PULLIAMN, RN, PHN on 8/23/2021 at 7:28 PM

## 2021-08-24 NOTE — PROGRESS NOTES
[facility]    Medicine Progress Note - Hospitalist Service       Date of Admission:  8/23/2021    Assessment & Plan           Pneumonia due to COVID-19 virus  Presenting with increased cough, shortness of breath  Discharged yesterday from [facility] with similar symptoms  Chronic oxygen use at 3 L at home due to COPD/asthma  Hypoxemic less than 90% at 3 L in [place] ER  X-ray imaging consistent with Covid-19  Admit for ongoing treatment for Covid with dexamethasone, remdesivir, Lovenox  Supplement oxygen to maintain sats over 92%  8/24/2021-Stable. Needing four LPM, inflammatory numbers up slightly, continue day 2 dex, day 2 remdisavir    Acute on chronic respiratory failure with hypoxia (H)  On chronic home oxygen at 3 L due to COPD/asthma  Hypoxemic in the [place] emergency department  Supplemental oxygen as above    Essential hypertension  Taking Toprol at home  Continue home dosing, monitor blood pressure    COPD with asthma (H)  Chronic issues, using inhalers at home  Continue albuterol, dexamethasone ordered, monitor           Diet: Regular Diet Adult    DVT Prophylaxis: Enoxaparin (Lovenox) SQ  Tirado Catheter: Not present  Central Lines: None  Code Status: Full Code      Disposition Plan   Expected discharge: 2 to 3 days recommended to prior living arrangement once mental status at baseline and stable oxygen requirements, able to care for self at home.     The patient's care was discussed with the Patient.    Mynor Westbrook MD  Hospitalist Service  [facility]  Securely message with the Vocera Web Console (learn more here)  Text page via [employer] Paging/Directory      Clinically Significant Risk Factors Present on Admission                   ______________________________________________________________________    Interval History   Feeling awful, but no worse. Requiring oxygen at 4 liters, deep cough. Denies diarrlea, eating poorly    Data reviewed today:  I reviewed all medications, new labs and imaging results over the last 24 hours. I personally reviewed no images or EKG's today.    Physical Exam   Vital Signs: Temp: 98.5  F (36.9  C) Temp src: Oral BP: (!) 145/87 Pulse: 87   Resp: 22 SpO2: 90 % O2 Device: Nasal cannula with humidification Oxygen Delivery: Others (comment) (4.5)  Weight: 235 lbs 14.4 oz  Constitutional: awake, alert, cooperative and mild distress  Respiratory: Crackles and rhonchi bilaterally  Cardiovascular: regular rate and rhythm  GI: normal bowel sounds, soft and non-distended    Data   Recent Labs   Lab 08/24/21  0707 08/23/21  1540 08/22/21  0828 08/21/21  0718 08/19/21  1001   WBC 9.5  --  4.1 4.0 7.2   HGB 14.9  --  13.2 12.4 14.4   MCV 86  --  89 90 90     --  191 165 156   INR  --  0.92  --   --   --      --  133* 135 135   POTASSIUM 3.7  --  4.0 4.3 4.2   CHLORIDE 95*  --  95* 98 95*   CO2 29  --  33* 31 32*   BUN 14  --  15 18 13   CR 0.72  --  0.68 0.66 0.88   ANIONGAP 10  --  5 6 8   JUDY 8.8  --  8.7 8.4* 8.8   *  --  109* 141* 157*   ALBUMIN  --   --   --   --  3.6   PROTTOTAL  --   --   --   --  7.1   BILITOTAL  --   --   --   --  0.6   ALKPHOS  --   --   --   --  28*   ALT  --   --   --   --  20   AST  --   --   --   --  34

## 2021-08-24 NOTE — PLAN OF CARE
Receiving oxygen therapy via NC @ 4 LPM with humidification. Oxygen sats 90-93% at rest. Dyspnea on exertion. Desats with exertion to 86-88%. Patient refused to use oxygen during activity. Grunting after activity. Oxygen sats take approx 10 minutes to return to 90% post-ambulation to and from the bathroom. Offered albuterol inhaler; patient declined. Shallow respirations. Congested, weak cough. Lung sounds are diminished and expiratory, wheezes throughout all lung fields. Fine crackles to right lung lobes. Patient refuses to notify staff of need for assistance. Bed alarm active and audible. SBA.     Temp: 98.4  F (36.9  C) Temp src: Tympanic BP: (!) 143/84 Pulse: 71   Resp: 20 SpO2: 90 % O2 Device: Nasal cannula with humidification Oxygen Delivery: 4 LPM     Beulah PULLIAMN, RN, PHN on 8/24/2021 at 5:14 AM

## 2021-08-24 NOTE — PLAN OF CARE
Lungs are diminished with crackles in RLL.  Now on 6L via HFNC.  Dyspnea with exertion, denies at rest.  Patient has wanted to rest/sleep and has not had much of an appetite.  Receiving steroids and remdesivir per MAR.  Patient has remained alert and oriented, VSS and afebrile.

## 2021-08-24 NOTE — PROGRESS NOTES
SAFETY CHECKLIST  ID Bands and Risk clasps correct and in place (DNR, Fall risk, Allergy, Latex, Limb):  Yes  All Lines Reconciled and labeled correctly: Yes  Whiteboard updated:Yes  Environmental interventions (bed/chair alarm on, call light, side rails, restraints, sitter....): Yes  Verify Tele #: N/A    Beulah PULLIAMN, RN, PHN on 8/23/2021 at 10:57 PM

## 2021-08-25 LAB
ANION GAP SERPL CALCULATED.3IONS-SCNC: 10 MMOL/L (ref 3–14)
BACTERIA BLD CULT: NO GROWTH
BUN SERPL-MCNC: 16 MG/DL (ref 7–25)
CALCIUM SERPL-MCNC: 8.7 MG/DL (ref 8.6–10.3)
CHLORIDE BLD-SCNC: 97 MMOL/L (ref 98–107)
CO2 SERPL-SCNC: 27 MMOL/L (ref 21–31)
CREAT SERPL-MCNC: 0.76 MG/DL (ref 0.6–1.2)
CRP SERPL-MCNC: 106 MG/L
D DIMER PPP FEU-MCNC: 0.97 UG/ML FEU (ref 0–0.5)
ERYTHROCYTE [DISTWIDTH] IN BLOOD BY AUTOMATED COUNT: 13.5 % (ref 10–15)
FIBRINOGEN PPP-MCNC: 537 MG/DL (ref 170–490)
GFR SERPL CREATININE-BSD FRML MDRD: 79 ML/MIN/1.73M2
GLUCOSE BLD-MCNC: 111 MG/DL (ref 70–105)
HCT VFR BLD AUTO: 42.2 % (ref 35–47)
HGB BLD-MCNC: 14.1 G/DL (ref 11.7–15.7)
HOLD SPECIMEN: NORMAL
HOLD SPECIMEN: NORMAL
MCH RBC QN AUTO: 29.3 PG (ref 26.5–33)
MCHC RBC AUTO-ENTMCNC: 33.4 G/DL (ref 31.5–36.5)
MCV RBC AUTO: 88 FL (ref 78–100)
PLATELET # BLD AUTO: 313 10E3/UL (ref 150–450)
POTASSIUM BLD-SCNC: 3.9 MMOL/L (ref 3.5–5.1)
RBC # BLD AUTO: 4.82 10E6/UL (ref 3.8–5.2)
SODIUM SERPL-SCNC: 134 MMOL/L (ref 134–144)
WBC # BLD AUTO: 9.1 10E3/UL (ref 4–11)

## 2021-08-25 PROCEDURE — 99232 SBSQ HOSP IP/OBS MODERATE 35: CPT | Performed by: FAMILY MEDICINE

## 2021-08-25 PROCEDURE — 85384 FIBRINOGEN ACTIVITY: CPT | Performed by: FAMILY MEDICINE

## 2021-08-25 PROCEDURE — 36415 COLL VENOUS BLD VENIPUNCTURE: CPT | Performed by: FAMILY MEDICINE

## 2021-08-25 PROCEDURE — 250N000011 HC RX IP 250 OP 636: Performed by: FAMILY MEDICINE

## 2021-08-25 PROCEDURE — 120N000001 HC R&B MED SURG/OB

## 2021-08-25 PROCEDURE — 85027 COMPLETE CBC AUTOMATED: CPT | Performed by: FAMILY MEDICINE

## 2021-08-25 PROCEDURE — 250N000012 HC RX MED GY IP 250 OP 636 PS 637: Performed by: FAMILY MEDICINE

## 2021-08-25 PROCEDURE — 85379 FIBRIN DEGRADATION QUANT: CPT | Performed by: FAMILY MEDICINE

## 2021-08-25 PROCEDURE — 86140 C-REACTIVE PROTEIN: CPT | Performed by: FAMILY MEDICINE

## 2021-08-25 PROCEDURE — 250N000013 HC RX MED GY IP 250 OP 250 PS 637: Performed by: FAMILY MEDICINE

## 2021-08-25 PROCEDURE — 80048 BASIC METABOLIC PNL TOTAL CA: CPT | Performed by: FAMILY MEDICINE

## 2021-08-25 RX ORDER — GUAIFENESIN 600 MG/1
600 TABLET, EXTENDED RELEASE ORAL 2 TIMES DAILY
Status: DISCONTINUED | OUTPATIENT
Start: 2021-08-25 | End: 2021-08-27 | Stop reason: HOSPADM

## 2021-08-25 RX ADMIN — METOPROLOL SUCCINATE 100 MG: 100 TABLET, EXTENDED RELEASE ORAL at 09:39

## 2021-08-25 RX ADMIN — ACETAMINOPHEN 650 MG: 325 TABLET, FILM COATED ORAL at 18:37

## 2021-08-25 RX ADMIN — ENOXAPARIN SODIUM 40 MG: 100 INJECTION SUBCUTANEOUS at 09:42

## 2021-08-25 RX ADMIN — GUAIFENESIN 600 MG: 600 TABLET, EXTENDED RELEASE ORAL at 22:37

## 2021-08-25 RX ADMIN — PANTOPRAZOLE SODIUM 40 MG: 40 TABLET, DELAYED RELEASE ORAL at 07:32

## 2021-08-25 RX ADMIN — TRAZODONE HYDROCHLORIDE 150 MG: 50 TABLET ORAL at 22:37

## 2021-08-25 RX ADMIN — BENZONATATE 200 MG: 100 CAPSULE ORAL at 10:10

## 2021-08-25 RX ADMIN — DEXAMETHASONE 6 MG: 2 TABLET ORAL at 13:30

## 2021-08-25 ASSESSMENT — ACTIVITIES OF DAILY LIVING (ADL)
ADLS_ACUITY_SCORE: 18

## 2021-08-25 ASSESSMENT — MIFFLIN-ST. JEOR: SCORE: 1580.47

## 2021-08-25 NOTE — PROVIDER NOTIFICATION
08/25/21 1100 08/25/21 1105 08/25/21 1110   Oxygen Therapy   SpO2 (!) 86 % (!) 89 % 92 %   O2 Device High Flow Nasal Cannula (HFNC) High Flow Nasal Cannula (HFNC) High Flow Nasal Cannula (HFNC)   Oxygen Delivery 8 LPM  (increased to 9L) 9 LPM  (increased to 10L) 10 LPM     Updated Dr. Westbrook about patient increase in oxygen.  No new orders at this time. Patient denies feeling short of breath with desaturations and no respiratory distress noted.  Patient lungs diminished with some crackles in bases.  Patient has maintained Sp02 89-93% on 10L via HFNC.  Will continue to monitor.

## 2021-08-25 NOTE — PROGRESS NOTES
Patient up with SBA to bathroom, continues to have nonproductive cough remains on oxygen, also given prn medication for cough. Katherine Tan RN on 8/25/2021 at 10:21 AM  Patient resting quietly through shift. sats remain lower 90%. Was given prn for cough with no success.  Remains a febrile will continue to monitor.  Katherine Tan RN on 8/25/2021 at 5:26 PM

## 2021-08-25 NOTE — PROGRESS NOTES
"Patient showered, desaturation to 86-88% with this activity.  Patient denies feeling shortness of breath during shower.  VSS, afebrile.  Sp02 88-94% on 10L throughout shift.  Lungs continue to have some fine crackles in bases.  Patient eating some meals, has better appetite today and states, \"I feel better today\".  Will continue to monitor   "

## 2021-08-25 NOTE — PLAN OF CARE
Pt A&O x4. Dyspnea with exertion. Increased weakness with ambulation. Utilizing 7LO2 HFNC. Satting 88-92. Frequent congested cough. Will desat to low 80's when coughing. Slow recovery. Continuous pulse ox in place. Denies pain throughout shift. All other VS stable. LS diminished. BS normoactive. IV patent flushed, SL. Offers no complaints at this time. Will continue to monitor. Temp: 97.9  F (36.6  C) Temp src: Tympanic BP: 115/71 Pulse: 81   Resp: 18 SpO2: 90 % O2 Device: High Flow Nasal Cannula (HFNC) Oxygen Delivery: 7 LPM

## 2021-08-25 NOTE — PROGRESS NOTES
[facility]    Medicine Progress Note - Hospitalist Service       Date of Admission:  8/23/2021    Assessment & Plan           Pneumonia due to COVID-19 virus  Presenting with increased cough, shortness of breath  Discharged yesterday from [facility] with similar symptoms  Chronic oxygen use at 3 L at home due to COPD/asthma  Hypoxemic less than 90% at 3 L in [place] ER  X-ray imaging consistent with Covid-19  Admit for ongoing treatment for Covid with dexamethasone, remdesivir, Lovenox  Supplement oxygen to maintain sats over 92%  8/24/2021-Stable. Needing four LPM, inflammatory numbers up slightly, continue day 2 dex, day 2 remdisavir  8/25/2021-More oxygen needs overnite at 7-8 LPM. Inflammatory numbers up again. Has finished a total of 5 days of Remdisavir, will stop, continue decadron. Continue current treatment    Acute on chronic respiratory failure with hypoxia (H)  On chronic home oxygen at 3 L due to COPD/asthma  Hypoxemic in the [place] emergency department  Supplemental oxygen as above    Essential hypertension  Taking Toprol at home  Continue home dosing, monitor blood pressure    COPD with asthma (H)  Chronic issues, using inhalers at home  Continue albuterol, dexamethasone ordered, monitor           Diet: Regular Diet Adult    DVT Prophylaxis: Enoxaparin (Lovenox) SQ  Tirado Catheter: Not present  Central Lines: None  Code Status: Full Code      Disposition Plan   Expected discharge:  2-3 days recommended to prior living arrangement once O2 use less than 4 liters/minute.     The patient's care was discussed with the Patient.    Mynor Westbrook MD  Hospitalist Service  [facility]  Securely message with the Vocera Web Console (learn more here)  Text page via [employer] Paging/Directory      Clinically Significant Risk Factors Present on Admission               ______________________________________________________________________    Interval History    Feeling maybe slightly better, appetite has improved, less achy. Requiring more oxygen, still  Short of air with activities    Data reviewed today: I reviewed all medications, new labs and imaging results over the last 24 hours. I personally reviewed no images or EKG's today.    Physical Exam   Vital Signs: Temp: 98.9  F (37.2  C) Temp src: Oral BP: 111/67 Pulse: 77   Resp: 24 SpO2: 90 % O2 Device: High Flow Nasal Cannula (HFNC) Oxygen Delivery: 8 LPM  Weight: 234 lbs 11.2 oz  Constitutional: awake, alert, cooperative and mild distress  Respiratory: mild crackles at bases  Cardiovascular: regular rate and rhythm  GI: normal bowel sounds, soft and non-distended    Data   Recent Labs   Lab 08/25/21  0621 08/24/21  0707 08/23/21  1540 08/22/21  0828 08/19/21  1001   WBC 9.1 9.5  --  4.1 7.2   HGB 14.1 14.9  --  13.2 14.4   MCV 88 86  --  89 90    293  --  191 156   INR  --   --  0.92  --   --     134  --  133* 135   POTASSIUM 3.9 3.7  --  4.0 4.2   CHLORIDE 97* 95*  --  95* 95*   CO2 27 29  --  33* 32*   BUN 16 14  --  15 13   CR 0.76 0.72  --  0.68 0.88   ANIONGAP 10 10  --  5 8   JUDY 8.7 8.8  --  8.7 8.8   * 111*  --  109* 157*   ALBUMIN  --   --   --   --  3.6   PROTTOTAL  --   --   --   --  7.1   BILITOTAL  --   --   --   --  0.6   ALKPHOS  --   --   --   --  28*   ALT  --   --   --   --  20   AST  --   --   --   --  34

## 2021-08-25 NOTE — PROGRESS NOTES
SAFETY CHECKLIST  ID Bands and Risk clasps correct and in place (DNR, Fall risk, Allergy, Latex, Limb):  Yes  All Lines Reconciled and labeled correctly: Yes  Whiteboard updated:Yes  Environmental interventions (bed/chair alarm on, call light, side rails, restraints, sitter....): Yes  Katherine Tan RN on 8/25/2021 at 7:20 AM

## 2021-08-26 PROBLEM — G93.40 ENCEPHALOPATHY: Status: ACTIVE | Noted: 2021-08-26

## 2021-08-26 LAB
ANION GAP SERPL CALCULATED.3IONS-SCNC: 8 MMOL/L (ref 3–14)
BUN SERPL-MCNC: 18 MG/DL (ref 7–25)
CALCIUM SERPL-MCNC: 8.8 MG/DL (ref 8.6–10.3)
CHLORIDE BLD-SCNC: 99 MMOL/L (ref 98–107)
CO2 SERPL-SCNC: 29 MMOL/L (ref 21–31)
CREAT SERPL-MCNC: 0.76 MG/DL (ref 0.6–1.2)
CRP SERPL-MCNC: 47 MG/L
D DIMER PPP FEU-MCNC: 0.77 UG/ML FEU (ref 0–0.5)
ERYTHROCYTE [DISTWIDTH] IN BLOOD BY AUTOMATED COUNT: 13.6 % (ref 10–15)
FIBRINOGEN PPP-MCNC: 529 MG/DL (ref 170–490)
GFR SERPL CREATININE-BSD FRML MDRD: 79 ML/MIN/1.73M2
GLUCOSE BLD-MCNC: 137 MG/DL (ref 70–105)
HCT VFR BLD AUTO: 43 % (ref 35–47)
HGB BLD-MCNC: 14.2 G/DL (ref 11.7–15.7)
HOLD SPECIMEN: NORMAL
HOLD SPECIMEN: NORMAL
MCH RBC QN AUTO: 29.4 PG (ref 26.5–33)
MCHC RBC AUTO-ENTMCNC: 33 G/DL (ref 31.5–36.5)
MCV RBC AUTO: 89 FL (ref 78–100)
PLATELET # BLD AUTO: 352 10E3/UL (ref 150–450)
POTASSIUM BLD-SCNC: 4.1 MMOL/L (ref 3.5–5.1)
RBC # BLD AUTO: 4.83 10E6/UL (ref 3.8–5.2)
SODIUM SERPL-SCNC: 136 MMOL/L (ref 134–144)
WBC # BLD AUTO: 8.6 10E3/UL (ref 4–11)

## 2021-08-26 PROCEDURE — 36415 COLL VENOUS BLD VENIPUNCTURE: CPT | Performed by: FAMILY MEDICINE

## 2021-08-26 PROCEDURE — 250N000012 HC RX MED GY IP 250 OP 636 PS 637: Performed by: FAMILY MEDICINE

## 2021-08-26 PROCEDURE — 80048 BASIC METABOLIC PNL TOTAL CA: CPT | Performed by: FAMILY MEDICINE

## 2021-08-26 PROCEDURE — 120N000001 HC R&B MED SURG/OB

## 2021-08-26 PROCEDURE — 85027 COMPLETE CBC AUTOMATED: CPT | Performed by: FAMILY MEDICINE

## 2021-08-26 PROCEDURE — 99232 SBSQ HOSP IP/OBS MODERATE 35: CPT | Performed by: FAMILY MEDICINE

## 2021-08-26 PROCEDURE — 85379 FIBRIN DEGRADATION QUANT: CPT | Performed by: FAMILY MEDICINE

## 2021-08-26 PROCEDURE — 250N000013 HC RX MED GY IP 250 OP 250 PS 637: Performed by: FAMILY MEDICINE

## 2021-08-26 PROCEDURE — 86140 C-REACTIVE PROTEIN: CPT | Performed by: FAMILY MEDICINE

## 2021-08-26 PROCEDURE — 85384 FIBRINOGEN ACTIVITY: CPT | Performed by: FAMILY MEDICINE

## 2021-08-26 RX ADMIN — PANTOPRAZOLE SODIUM 40 MG: 40 TABLET, DELAYED RELEASE ORAL at 07:56

## 2021-08-26 RX ADMIN — DEXAMETHASONE 6 MG: 2 TABLET ORAL at 13:54

## 2021-08-26 RX ADMIN — METOPROLOL SUCCINATE 100 MG: 100 TABLET, EXTENDED RELEASE ORAL at 09:48

## 2021-08-26 RX ADMIN — TRAZODONE HYDROCHLORIDE 150 MG: 50 TABLET ORAL at 22:44

## 2021-08-26 RX ADMIN — GUAIFENESIN 600 MG: 600 TABLET, EXTENDED RELEASE ORAL at 22:44

## 2021-08-26 RX ADMIN — GUAIFENESIN 600 MG: 600 TABLET, EXTENDED RELEASE ORAL at 09:48

## 2021-08-26 ASSESSMENT — ACTIVITIES OF DAILY LIVING (ADL)
ADLS_ACUITY_SCORE: 18

## 2021-08-26 NOTE — PROGRESS NOTES
[facility]    Medicine Progress Note - Hospitalist Service       Date of Admission:  8/23/2021    Assessment & Plan           Pneumonia due to COVID-19 virus  Presenting with increased cough, shortness of breath  Discharged yesterday from [facility] with similar symptoms  Chronic oxygen use at 3 L at home due to COPD/asthma  Hypoxemic less than 90% at 3 L in [place] ER  X-ray imaging consistent with Covid-19  Admit for ongoing treatment for Covid with dexamethasone, remdesivir, Lovenox  Supplement oxygen to maintain sats over 92%  8/24/2021-Stable. Needing four LPM, inflammatory numbers up slightly, continue day 2 dex, day 2 remdisavir  8/25/2021-More oxygen needs overnite at 7-8 LPM. Inflammatory numbers up again. Has finished a total of 5 days of Remdisavir, will stop, continue decadron. Continue current treatment  8/26/2021-Oxygen needs better, at 6 liters. Continue decadron, feeling better, likely home in 1-2 days    Acute on chronic respiratory failure with hypoxia (H)  On chronic home oxygen at 3 L due to COPD/asthma  Hypoxemic in the [place] emergency department  Supplemental oxygen as above    Essential hypertension  Taking Toprol at home  Continue home dosing, monitor blood pressure    COPD with asthma (H)  Chronic issues, using inhalers at home  Continue albuterol, dexamethasone ordered, monitor    Encephalopathy  Initially confused, likely due to underlying Covid   Improved by second day           Diet: Regular Diet Adult    DVT Prophylaxis: Enoxaparin (Lovenox) SQ  T[place]ado Catheter: Not present  Central Lines: None  Code Status: Full Code      Disposition Plan   Expected discharge:  1-2 days recommended to prior living arrangement once O2 use less than 4-5 liters/minute.     The patient's care was discussed with the Patient.    Mynor Westbrook MD  Hospitalist Service  [facility]  Securely message with the Vocera Web Console (learn more here)  Text  page via [facility] Paging/Directory      Clinically Significant Risk Factors Present on Admission               ______________________________________________________________________    Interval History   Feeling better today, still SOB with activity, but appetite better, pain better    Data reviewed today: I reviewed all medications, new labs and imaging results over the last 24 hours. I personally reviewed no images or EKG's today.    Physical Exam   Vital Signs: Temp: 97.9  F (36.6  C) Temp src: Oral BP: 128/68 Pulse: (P) 63   Resp: 16 SpO2: (P) 92 % O2 Device: High Flow Nasal Cannula (HFNC) Oxygen Delivery: 6 LPM  Weight: 234 lbs 11.2 oz  Constitutional: awake, alert, cooperative, no apparent distress, and appears stated age  Respiratory: few crackles at base  Cardiovascular: regular rate and rhythm  GI: soft, non-distended and non-tender    Data   Recent Labs   Lab 08/26/21  0654 08/25/21  0621 08/24/21  0707 08/23/21  1540   WBC 8.6 9.1 9.5  --    HGB 14.2 14.1 14.9  --    MCV 89 88 86  --     313 293  --    INR  --   --   --  0.92    134 134  --    POTASSIUM 4.1 3.9 3.7  --    CHLORIDE 99 97* 95*  --    CO2 29 27 29  --    BUN 18 16 14  --    CR 0.76 0.76 0.72  --    ANIONGAP 8 10 10  --    JUDY 8.8 8.7 8.8  --    * 111* 111*  --

## 2021-08-26 NOTE — PROGRESS NOTES
Patient has rested well this HS shift. Patient requested this RN to turn down her O2. O2 is  Now at 9L with patient sating at 93%. Patient has had no c/o pain this HS shift.    Will continue to monitor O2 sats.

## 2021-08-26 NOTE — PROGRESS NOTES
Patient remains in bed through most of the shift, O2 turned down throughout shift. Tolerating well, appetite improving well. Patient up independently to bathroom. All other assessments as charted.   Katherine Tan RN on 8/26/2021 at 2:14 PM

## 2021-08-26 NOTE — PROGRESS NOTES
Patient continues to be breathing with more ease this morning, O2 turned downb to 6lmp, encouraged to us IS also to get up in recliner more today, will continue to monitor. Katherine Tan RN on 8/26/2021 at 8:57 AM

## 2021-08-26 NOTE — PROGRESS NOTES
SAFETY CHECKLIST  ID Bands and Risk clasps correct and in place (DNR, Fall risk, Allergy, Latex, Limb):  Yes  All Lines Reconciled and labeled correctly: Yes  Whiteboard updated:Yes  Environmental interventions (bed/chair alarm on, call light, side rails, restraints, sitter....): Yes  Verify Tele #:   Katherine Tan RN on 8/26/2021 at 7:31 AM

## 2021-08-26 NOTE — PLAN OF CARE
Patient  is doing well on 9 L of o2, stats are 96-97%. Patient has denied pain this HS shift.     B/P: 122/82, T: 97.4, P: 56, R: 20. O2 96%

## 2021-08-27 VITALS
SYSTOLIC BLOOD PRESSURE: 124 MMHG | BODY MASS INDEX: 39.09 KG/M2 | RESPIRATION RATE: 16 BRPM | HEART RATE: 62 BPM | WEIGHT: 234.6 LBS | HEIGHT: 65 IN | TEMPERATURE: 97.6 F | DIASTOLIC BLOOD PRESSURE: 70 MMHG | OXYGEN SATURATION: 92 %

## 2021-08-27 LAB
ANION GAP SERPL CALCULATED.3IONS-SCNC: 8 MMOL/L (ref 3–14)
BUN SERPL-MCNC: 17 MG/DL (ref 7–25)
CALCIUM SERPL-MCNC: 8.8 MG/DL (ref 8.6–10.3)
CHLORIDE BLD-SCNC: 97 MMOL/L (ref 98–107)
CO2 SERPL-SCNC: 29 MMOL/L (ref 21–31)
CREAT SERPL-MCNC: 0.8 MG/DL (ref 0.6–1.2)
CRP SERPL-MCNC: 16 MG/L
D DIMER PPP FEU-MCNC: 1.53 UG/ML FEU (ref 0–0.5)
ERYTHROCYTE [DISTWIDTH] IN BLOOD BY AUTOMATED COUNT: 13.7 % (ref 10–15)
FIBRINOGEN PPP-MCNC: 448 MG/DL (ref 170–490)
GFR SERPL CREATININE-BSD FRML MDRD: 74 ML/MIN/1.73M2
GLUCOSE BLD-MCNC: 175 MG/DL (ref 70–105)
HCT VFR BLD AUTO: 41.3 % (ref 35–47)
HGB BLD-MCNC: 13.8 G/DL (ref 11.7–15.7)
HOLD SPECIMEN: NORMAL
MCH RBC QN AUTO: 29.8 PG (ref 26.5–33)
MCHC RBC AUTO-ENTMCNC: 33.4 G/DL (ref 31.5–36.5)
MCV RBC AUTO: 89 FL (ref 78–100)
PLATELET # BLD AUTO: 359 10E3/UL (ref 150–450)
POTASSIUM BLD-SCNC: 4.3 MMOL/L (ref 3.5–5.1)
RBC # BLD AUTO: 4.63 10E6/UL (ref 3.8–5.2)
SODIUM SERPL-SCNC: 134 MMOL/L (ref 134–144)
WBC # BLD AUTO: 7.9 10E3/UL (ref 4–11)

## 2021-08-27 PROCEDURE — 85014 HEMATOCRIT: CPT | Performed by: FAMILY MEDICINE

## 2021-08-27 PROCEDURE — 250N000013 HC RX MED GY IP 250 OP 250 PS 637: Performed by: FAMILY MEDICINE

## 2021-08-27 PROCEDURE — 80048 BASIC METABOLIC PNL TOTAL CA: CPT | Performed by: FAMILY MEDICINE

## 2021-08-27 PROCEDURE — 99239 HOSP IP/OBS DSCHRG MGMT >30: CPT | Performed by: FAMILY MEDICINE

## 2021-08-27 PROCEDURE — 85384 FIBRINOGEN ACTIVITY: CPT | Performed by: FAMILY MEDICINE

## 2021-08-27 PROCEDURE — 36415 COLL VENOUS BLD VENIPUNCTURE: CPT | Performed by: FAMILY MEDICINE

## 2021-08-27 PROCEDURE — 86140 C-REACTIVE PROTEIN: CPT | Performed by: FAMILY MEDICINE

## 2021-08-27 PROCEDURE — 85379 FIBRIN DEGRADATION QUANT: CPT | Performed by: FAMILY MEDICINE

## 2021-08-27 RX ORDER — PREDNISONE 10 MG/1
TABLET ORAL
Qty: 11 TABLET | Refills: 0 | Status: SHIPPED | OUTPATIENT
Start: 2021-08-27

## 2021-08-27 RX ADMIN — PANTOPRAZOLE SODIUM 40 MG: 40 TABLET, DELAYED RELEASE ORAL at 10:37

## 2021-08-27 RX ADMIN — GUAIFENESIN 600 MG: 600 TABLET, EXTENDED RELEASE ORAL at 10:37

## 2021-08-27 RX ADMIN — METOPROLOL SUCCINATE 100 MG: 100 TABLET, EXTENDED RELEASE ORAL at 10:36

## 2021-08-27 ASSESSMENT — ACTIVITIES OF DAILY LIVING (ADL)
ADLS_ACUITY_SCORE: 18
ADLS_ACUITY_SCORE: 17
ADLS_ACUITY_SCORE: 18

## 2021-08-27 ASSESSMENT — MIFFLIN-ST. JEOR: SCORE: 1580.02

## 2021-08-27 NOTE — PLAN OF CARE
Patient denies pain. Alert and orientated x4. O2 >94% on 2 LPM via nasal cannula. Infrequent, productive cough. LS diminished throughout. Dyspnea with exertion. Self-administration of incentive spirometer. Independent in room. Bradycardic while sleeping; HR in 40's. Asymptomatic. Increases to 60 bpm when woken up. Will continue to monitor.

## 2021-08-27 NOTE — PHARMACY - DISCHARGE MEDICATION RECONCILIATION AND EDUCATION
Pharmacy: Discharge Counseling and Medication Reconciliation    Prisca Casillas  [address]  [facility] [address]  [phone] (home)   70 year old female  PCP:Mariel Godinez    Allergies   Allergen Reactions     Sulfa Drugs      Other reaction(s): Seizures  Was reported to her by family as a child     Flu Virus Vaccine Swelling     Pneumococcal Polysaccharides Swelling       Discharge Counseling:    Pharmacist met with patient (and/or family) today to review the medication portion of the After Visit Summary (with an emphasis on NEW medications) and to address patient's questions/concerns.     Summary of Education:   Met with patient at time of discharge to review all changes in medications including new prednisone khalif. Discussed indications, directions for use, and possible side effects. Patient asked a few questions and all concerns were addressed. Discussed stopping doxycycline. Nasir to educate re: s/s of DVT/PE as patient hung up on pharmacist and unable to complete discharge education.     Materials Provided:   MedCounselor sheets printed from Clinical Pharmacology on: none new to patient    Discharge Medication Reconciliation:    Nuzhat French RPH has reviewed the patient's discharge medication orders and has compared them to the inpatient medication administration record and to what the patient was taking prior to admission- any discrepancies have been resolved.     It has been determined that the patient has an adequate supply of medications available or which can be obtained from the patient's preferred pharmacy, which has been confirmed as: none new to patient.    Thank you for the consult.     Nuzhat French RPH ....................  8/27/2021   2:44 PM

## 2021-08-27 NOTE — DISCHARGE SUMMARY
[facility]  Hospitalist Discharge Summary      Date of Admission:  8/23/2021  Date of Discharge:  8/27/2021  Discharging Provider: Mynor Westbrook MD      Discharge Diagnoses   Active Problems:    Pneumonia due to COVID-19 virus    Acute on chronic respiratory failure with hypoxia (H)    Essential hypertension    COPD with asthma (H)    Encephalopathy        Follow-ups Needed After Discharge   Follow-up Appointments     Follow-up and recommended labs and tests       Follow up with primary care provider, Mariel Godinez, within 7 days for   hospital follow- up.  No follow up labs or test are needed.             Unresulted Labs Ordered in the Past 30 Days of this Admission     No orders found from 7/24/2021 to 8/24/2021.      These results will be followed up by Kellie at [facility]    Discharge Disposition   Discharged to home  Condition at discharge: Satisfactory      Hospital Course   Pneumonia due to COVID-19 virus  Presenting with increased cough, shortness of breath  Discharged yesterday from [facility] with similar symptoms  Chronic oxygen use at 3 L at home due to COPD/asthma  Hypoxemic less than 90% at 3 L in [place] ER  X-ray imaging consistent with Covid-19  Admit for ongoing treatment for Covid with dexamethasone, remdesivir, Lovenox  Supplement oxygen to maintain sats over 92%  8/24/2021-Stable. Needing four LPM, inflammatory numbers up slightly, continue day 2 dex, day 2 remdisavir  8/25/2021-More oxygen needs overnite at 7-8 LPM. Inflammatory numbers up again. Has finished a total of 5 days of Remdisavir, will stop, continue decadron. Continue current treatment  8/26/2021-Oxygen needs better, at 6 liters. Continue decadron, feeling better, likely home in 1-2 days  8/27/2021-better today, on 2 liters, feeling better asking to go home. Will be discharged home, will discharge with tapering dose of steroids, blood thinners will be stopped. Recommend continued rest,  discussed quarantine for another week    Acute on chronic respiratory failure with hypoxia (H)  On chronic home oxygen at 3 L due to COPD/asthma  Hypoxemic in the [place] emergency department  Supplemental oxygen as above  At discharge back to baseline oxygen needs    Essential hypertension  Taking Toprol at home  Continue home dosing, monitor blood pressure    COPD with asthma (H)  Chronic issues, using inhalers at home  Continue albuterol, dexamethasone ordered, monitor    Encephalopathy  Initially confused, likely due to underlying Covid   Improved by second day        Consultations This Hospital Stay   None    Code Status   Full Code    Time Spent on this Encounter   I, Mynor Westbrook MD, personally saw the patient today and spent greater than 30 minutes discharging this patient.       Mynor Westbrook MD  [facility]  [address]   [place] [address]  Phone: [phone]  Fax: [phone]  ______________________________________________________________________    Physical Exam   Vital Signs: Temp: 97.6  F (36.4  C) Temp src: Oral BP: 124/70 Pulse: 62   Resp: 16 SpO2: 92 % O2 Device: Nasal cannula with humidification Oxygen Delivery: 2 LPM  Weight: 234 lbs 9.6 oz  Constitutional: awake, alert, cooperative, no apparent distress, and appears stated age, sitting on bed  Respiratory: No increased work of breathing, good air exchange, clear to auscultation bilaterally, no crackles or wheezing  Cardiovascular: regular rate and rhythm  GI: normal bowel sounds, soft and non-distended       Primary Care Physician   Mariel Godinez    Discharge Orders      Reason for your hospital stay    Worsening shortness of breath due to Covid-19     Follow-up and recommended labs and tests     Follow up with primary care provider, Mariel Godinez, within 7 days for hospital follow- up.  No follow up labs or test are needed.     Activity    Your activity upon discharge: activity as  tolerated     Diet    Follow this diet upon discharge: Orders Placed This Encounter      Regular Diet Adult       Significant Results and Procedures   Most Recent 3 CBC's:Recent Labs   Lab Test 08/27/21  0641 08/26/21  0654 08/25/21  0621   WBC 7.9 8.6 9.1   HGB 13.8 14.2 14.1   MCV 89 89 88    352 313     Most Recent 3 BMP's:Recent Labs   Lab Test 08/27/21  0641 08/26/21  0654 08/25/21  0621    136 134   POTASSIUM 4.3 4.1 3.9   CHLORIDE 97* 99 97*   CO2 29 29 27   BUN 17 18 16   CR 0.80 0.76 0.76   ANIONGAP 8 8 10   JUDY 8.8 8.8 8.7   * 137* 111*     Most Recent 3 INR's:Recent Labs   Lab Test 08/23/21  1540   INR 0.92     Most Recent 3 Troponin's:No lab results found.  Most Recent ABG:No lab results found.  Most Recent ESR & CRP:Recent Labs   Lab Test 08/27/21 0641   CRP 16.0*   ,   Results for orders placed or performed during the hospital encounter of 08/19/21   XR Chest Port 1 View    Narrative    Exam:  XR CHEST PORT 1 VIEW    HISTORY: Dyspnea/SOB.    COMPARISON:  2/11/2014    FINDINGS:     The cardiomediastinal contours are stable.     There are multifocal patchy opacities in the mid to lower lungs  bilaterally. No pleural effusion or pneumothorax.      No acute osseous abnormality. No subdiaphragmatic free air.      Impression    IMPRESSION:      Multifocal patchy opacities in the mid to lower lungs bilaterally,  which may be due to aspiration or pneumonia.    LORENZO TAVERA MD         SYSTEM ID:  [id number]       Discharge Medications   Current Discharge Medication List      CONTINUE these medications which have CHANGED    Details   predniSONE (DELTASONE) 10 MG tablet Take 2 tabs by mouth daily x 3 days, then 1 tabs daily x 3 days, , then 1/2 tab daily x 3 days, then stop.  Qty: 11 tablet, Refills: 0    Associated Diagnoses: Pneumonia due to COVID-19 virus         CONTINUE these medications which have NOT CHANGED    Details   albuterol (PROAIR HFA/PROVENTIL HFA/VENTOLIN HFA) 108 (90 Base)  MCG/ACT Inhaler Inhale 2 puffs into the lungs every 6 hours as needed       albuterol (PROVENTIL) (2.5 MG/3ML) 0.083% neb solution Take 2.5 mg by nebulization every 4 hours as needed for shortness of breath / dyspnea or wheezing       buPROPion (WELLBUTRIN XL) 150 MG 24 hr tablet Take 150 mg by mouth every morning      cholecalciferol (VITAMIN D3) 25 mcg (1000 units) capsule Take 2 capsules by mouth daily      cyanocobalamin (SM VITAMIN B-12) 500 MCG TABS Take 2 tablets by mouth daily       fluticasone-salmeterol (ADVAIR) 500-50 MCG/DOSE inhaler Inhale 1 puff into the lungs every 12 hours      hydrOXYzine (ATARAX) 25 MG tablet Take 25 mg by mouth nightly as needed       lidocaine (LIDODERM) 5 % patch Place 1 patch onto the skin daily as needed To prevent lidocaine toxicity, patient should be patch free for 12 hrs daily.       metoprolol succinate (TOPROL-XL) 100 MG 24 hr tablet Take 100 mg by mouth daily      olopatadine (PATANOL) 0.1 % ophthalmic solution Instill one drop in affected eye(s) twice daily as needed for allergies      tiotropium (SPIRIVA) 18 MCG inhaled capsule Inhale 18 mcg into the lungs daily      traZODone (DESYREL) 100 MG tablet Take 150 mg by mouth At Bedtime          STOP taking these medications       doxycycline hyclate (VIBRAMYCIN) 100 MG capsule Comments:   Reason for Stopping:         omeprazole (PRILOSEC) 20 MG DR capsule Comments:   Reason for Stopping:             Allergies   Allergies   Allergen Reactions     Sulfa Drugs      Other reaction(s): Seizures  Was reported to her by family as a child     Flu Virus Vaccine Swelling     Pneumococcal Polysaccharides Swelling

## 2021-08-27 NOTE — PROGRESS NOTES
Gave discharge instructions to patient and answered questions.  Wheelchair ride to family's car at 1141.

## 2021-08-30 ENCOUNTER — PATIENT OUTREACH (OUTPATIENT)
Dept: CARE COORDINATION | Facility: CLINIC | Age: 71
End: 2021-08-30

## 2021-08-30 NOTE — PROGRESS NOTES
Clinic Care Coordination Contact  After short review of chart, patient has primary care provider elsewhere Mariel Godinez PA-C .  No TCM call required.  Destiny Hayes RN on 8/30/2021 at 10:07 AM

## 2022-08-22 PROCEDURE — 180N000001 HC R&B LOA DAYS

## 2024-02-08 ENCOUNTER — TRANSFERRED RECORDS (OUTPATIENT)
Dept: HEALTH INFORMATION MANAGEMENT | Facility: CLINIC | Age: 74
End: 2024-02-08
Payer: COMMERCIAL

## 2024-02-15 ENCOUNTER — TRANSFERRED RECORDS (OUTPATIENT)
Dept: HEALTH INFORMATION MANAGEMENT | Facility: CLINIC | Age: 74
End: 2024-02-15
Payer: COMMERCIAL

## 2024-02-15 ENCOUNTER — MEDICAL CORRESPONDENCE (OUTPATIENT)
Dept: HEALTH INFORMATION MANAGEMENT | Facility: CLINIC | Age: 74
End: 2024-02-15
Payer: COMMERCIAL

## (undated) RX ORDER — OLANZAPINE 10 MG/2ML
INJECTION, POWDER, FOR SOLUTION INTRAMUSCULAR
Status: DISPENSED
Start: 2021-08-21

## (undated) RX ORDER — SODIUM CHLORIDE, SODIUM LACTATE, POTASSIUM CHLORIDE, CALCIUM CHLORIDE 600; 310; 30; 20 MG/100ML; MG/100ML; MG/100ML; MG/100ML
INJECTION, SOLUTION INTRAVENOUS
Status: DISPENSED
Start: 2021-08-19

## (undated) RX ORDER — ACETAMINOPHEN 325 MG/1
TABLET ORAL
Status: DISPENSED
Start: 2021-08-19

## (undated) RX ORDER — METHYLPREDNISOLONE SODIUM SUCCINATE 125 MG/2ML
INJECTION, POWDER, LYOPHILIZED, FOR SOLUTION INTRAMUSCULAR; INTRAVENOUS
Status: DISPENSED
Start: 2021-08-19

## (undated) RX ORDER — WATER 10 ML/10ML
INJECTION INTRAMUSCULAR; INTRAVENOUS; SUBCUTANEOUS
Status: DISPENSED
Start: 2021-08-21